# Patient Record
Sex: FEMALE | Race: BLACK OR AFRICAN AMERICAN | NOT HISPANIC OR LATINO | Employment: FULL TIME | URBAN - METROPOLITAN AREA
[De-identification: names, ages, dates, MRNs, and addresses within clinical notes are randomized per-mention and may not be internally consistent; named-entity substitution may affect disease eponyms.]

---

## 2017-10-25 ENCOUNTER — PRENATAL OFFICE VISIT - HEALTHEAST (OUTPATIENT)
Dept: MIDWIFE SERVICES | Facility: CLINIC | Age: 32
End: 2017-10-25

## 2017-10-25 DIAGNOSIS — Z75.8 LANGUAGE BARRIER: ICD-10-CM

## 2017-10-25 DIAGNOSIS — Z34.80 ENCOUNTER FOR SUPERVISION OF OTHER NORMAL PREGNANCY: ICD-10-CM

## 2017-10-25 DIAGNOSIS — Z60.3 LANGUAGE BARRIER: ICD-10-CM

## 2017-10-25 LAB — HIV 1+2 AB+HIV1 P24 AG SERPL QL IA: NEGATIVE

## 2017-10-25 SDOH — SOCIAL STABILITY - SOCIAL INSECURITY: ACCULTURATION DIFFICULTY: Z60.3

## 2017-10-25 ASSESSMENT — MIFFLIN-ST. JEOR: SCORE: 1370.13

## 2017-10-26 LAB
HBV SURFACE AG SERPL QL IA: NEGATIVE
SYPHILIS RPR SCREEN - HISTORICAL: NORMAL

## 2017-10-27 ENCOUNTER — AMBULATORY - HEALTHEAST (OUTPATIENT)
Dept: OBGYN | Facility: CLINIC | Age: 32
End: 2017-10-27

## 2017-10-27 DIAGNOSIS — Z75.8 LANGUAGE BARRIER: ICD-10-CM

## 2017-10-27 DIAGNOSIS — Z60.3 LANGUAGE BARRIER: ICD-10-CM

## 2017-10-27 DIAGNOSIS — Z34.82 ENCOUNTER FOR SUPERVISION OF OTHER NORMAL PREGNANCY IN SECOND TRIMESTER: ICD-10-CM

## 2017-10-27 SDOH — SOCIAL STABILITY - SOCIAL INSECURITY: ACCULTURATION DIFFICULTY: Z60.3

## 2017-10-30 LAB
HEMOGLOBIN A2 QUANTITATION: 3.6 % (ref 2.2–3.5)
HEMOGLOBIN ELECTROPHRESIS: ABNORMAL
HEMOGLOBIN F QUANTITATION: <0.8 % (ref 0–2)
PATH ICD:: ABNORMAL
REVIEWING PATHOLOGIST: ABNORMAL

## 2017-11-02 ENCOUNTER — HOSPITAL ENCOUNTER (OUTPATIENT)
Dept: ULTRASOUND IMAGING | Facility: HOSPITAL | Age: 32
Discharge: HOME | End: 2017-11-02
Attending: ADVANCED PRACTICE MIDWIFE

## 2017-11-02 DIAGNOSIS — Z34.80 ENCOUNTER FOR SUPERVISION OF OTHER NORMAL PREGNANCY: ICD-10-CM

## 2017-11-03 ENCOUNTER — PRENATAL OFFICE VISIT - HEALTHEAST (OUTPATIENT)
Dept: MIDWIFE SERVICES | Facility: CLINIC | Age: 32
End: 2017-11-03

## 2017-11-03 ENCOUNTER — AMBULATORY - HEALTHEAST (OUTPATIENT)
Dept: OBGYN | Facility: CLINIC | Age: 32
End: 2017-11-03

## 2017-11-03 ENCOUNTER — AMBULATORY - HEALTHEAST (OUTPATIENT)
Dept: MIDWIFE SERVICES | Facility: CLINIC | Age: 32
End: 2017-11-03

## 2017-11-03 DIAGNOSIS — O99.012 ANEMIA DURING PREGNANCY IN SECOND TRIMESTER: ICD-10-CM

## 2017-11-03 DIAGNOSIS — Z12.4 PAP SMEAR FOR CERVICAL CANCER SCREENING: ICD-10-CM

## 2017-11-03 DIAGNOSIS — Z34.82 ENCOUNTER FOR SUPERVISION OF OTHER NORMAL PREGNANCY IN SECOND TRIMESTER: ICD-10-CM

## 2017-11-03 ASSESSMENT — MIFFLIN-ST. JEOR: SCORE: 1374.22

## 2017-11-06 ENCOUNTER — AMBULATORY - HEALTHEAST (OUTPATIENT)
Dept: MIDWIFE SERVICES | Facility: CLINIC | Age: 32
End: 2017-11-06

## 2017-11-07 ENCOUNTER — AMBULATORY - HEALTHEAST (OUTPATIENT)
Dept: OBGYN | Facility: CLINIC | Age: 32
End: 2017-11-07

## 2017-11-10 LAB
HPV INTERPRETATION - HISTORICAL: ABNORMAL
HPV INTERPRETER - HISTORICAL: ABNORMAL

## 2017-11-22 ENCOUNTER — PRENATAL OFFICE VISIT - HEALTHEAST (OUTPATIENT)
Dept: MIDWIFE SERVICES | Facility: CLINIC | Age: 32
End: 2017-11-22

## 2017-11-22 ENCOUNTER — AMBULATORY - HEALTHEAST (OUTPATIENT)
Dept: OBGYN | Facility: CLINIC | Age: 32
End: 2017-11-22

## 2017-11-22 DIAGNOSIS — R87.619 ABNORMAL PAP SMEAR OF CERVIX: ICD-10-CM

## 2017-11-22 DIAGNOSIS — Z34.80 ENCOUNTER FOR SUPERVISION OF OTHER NORMAL PREGNANCY: ICD-10-CM

## 2017-11-22 DIAGNOSIS — O99.012 ANEMIA DURING PREGNANCY IN SECOND TRIMESTER: ICD-10-CM

## 2017-11-22 ASSESSMENT — MIFFLIN-ST. JEOR: SCORE: 1388.28

## 2017-11-29 ENCOUNTER — HOSPITAL ENCOUNTER (OUTPATIENT)
Dept: ULTRASOUND IMAGING | Facility: HOSPITAL | Age: 32
Discharge: HOME | End: 2017-11-29
Attending: ADVANCED PRACTICE MIDWIFE

## 2017-11-29 ENCOUNTER — COMMUNICATION - HEALTHEAST (OUTPATIENT)
Dept: MIDWIFE SERVICES | Facility: CLINIC | Age: 32
End: 2017-11-29

## 2017-11-29 DIAGNOSIS — Z34.80 ENCOUNTER FOR SUPERVISION OF OTHER NORMAL PREGNANCY: ICD-10-CM

## 2017-12-01 ENCOUNTER — COMMUNICATION - HEALTHEAST (OUTPATIENT)
Dept: OBGYN | Facility: CLINIC | Age: 32
End: 2017-12-01

## 2017-12-01 ENCOUNTER — AMBULATORY - HEALTHEAST (OUTPATIENT)
Dept: OBGYN | Facility: CLINIC | Age: 32
End: 2017-12-01

## 2017-12-01 DIAGNOSIS — O99.012 ANEMIA DURING PREGNANCY IN SECOND TRIMESTER: ICD-10-CM

## 2017-12-01 DIAGNOSIS — Z75.8 LANGUAGE BARRIER: ICD-10-CM

## 2017-12-01 DIAGNOSIS — Z34.82 ENCOUNTER FOR SUPERVISION OF OTHER NORMAL PREGNANCY IN SECOND TRIMESTER: ICD-10-CM

## 2017-12-01 DIAGNOSIS — Z60.3 LANGUAGE BARRIER: ICD-10-CM

## 2017-12-01 SDOH — SOCIAL STABILITY - SOCIAL INSECURITY: ACCULTURATION DIFFICULTY: Z60.3

## 2017-12-07 ENCOUNTER — AMBULATORY - HEALTHEAST (OUTPATIENT)
Dept: OBGYN | Facility: CLINIC | Age: 32
End: 2017-12-07

## 2017-12-07 DIAGNOSIS — N72 HIGH RISK HUMAN PAPILLOMA VIRUS (HPV) INFECTION OF CERVIX: ICD-10-CM

## 2017-12-07 DIAGNOSIS — B97.7 HIGH RISK HUMAN PAPILLOMA VIRUS (HPV) INFECTION OF CERVIX: ICD-10-CM

## 2017-12-07 ASSESSMENT — MIFFLIN-ST. JEOR: SCORE: 1397.35

## 2017-12-20 ENCOUNTER — PRENATAL OFFICE VISIT - HEALTHEAST (OUTPATIENT)
Dept: MIDWIFE SERVICES | Facility: CLINIC | Age: 32
End: 2017-12-20

## 2017-12-20 DIAGNOSIS — O99.019 ANTEPARTUM ANEMIA: ICD-10-CM

## 2017-12-20 DIAGNOSIS — Z34.80 ENCOUNTER FOR SUPERVISION OF OTHER NORMAL PREGNANCY: ICD-10-CM

## 2017-12-20 ASSESSMENT — MIFFLIN-ST. JEOR: SCORE: 1401.89

## 2018-01-15 ENCOUNTER — PRENATAL OFFICE VISIT - HEALTHEAST (OUTPATIENT)
Dept: MIDWIFE SERVICES | Facility: CLINIC | Age: 33
End: 2018-01-15

## 2018-01-15 DIAGNOSIS — M54.9 BACK PAIN AFFECTING PREGNANCY IN SECOND TRIMESTER: ICD-10-CM

## 2018-01-15 DIAGNOSIS — O99.891 BACK PAIN AFFECTING PREGNANCY IN SECOND TRIMESTER: ICD-10-CM

## 2018-01-15 DIAGNOSIS — Z34.83 ENCOUNTER FOR SUPERVISION OF OTHER NORMAL PREGNANCY IN THIRD TRIMESTER: ICD-10-CM

## 2018-01-15 LAB
FASTING STATUS PATIENT QL REPORTED: NO
GLUCOSE 1H P 50 G GLC PO SERPL-MCNC: 81 MG/DL (ref 70–139)
HGB BLD-MCNC: 12.3 G/DL (ref 12–16)

## 2018-01-15 ASSESSMENT — MIFFLIN-ST. JEOR: SCORE: 1424.57

## 2018-01-16 LAB — SYPHILIS RPR SCREEN - HISTORICAL: NORMAL

## 2018-02-16 ENCOUNTER — PRENATAL OFFICE VISIT - HEALTHEAST (OUTPATIENT)
Dept: MIDWIFE SERVICES | Facility: CLINIC | Age: 33
End: 2018-02-16

## 2018-02-16 DIAGNOSIS — O99.019 ANTEPARTUM ANEMIA: ICD-10-CM

## 2018-02-16 DIAGNOSIS — Z60.3 LANGUAGE BARRIER: ICD-10-CM

## 2018-02-16 DIAGNOSIS — Z34.83 ENCOUNTER FOR SUPERVISION OF OTHER NORMAL PREGNANCY IN THIRD TRIMESTER: ICD-10-CM

## 2018-02-16 DIAGNOSIS — O99.013 ANEMIA DURING PREGNANCY IN THIRD TRIMESTER: ICD-10-CM

## 2018-02-16 DIAGNOSIS — Z75.8 LANGUAGE BARRIER: ICD-10-CM

## 2018-02-16 SDOH — SOCIAL STABILITY - SOCIAL INSECURITY: ACCULTURATION DIFFICULTY: Z60.3

## 2018-02-16 ASSESSMENT — MIFFLIN-ST. JEOR: SCORE: 1431.37

## 2018-03-02 ENCOUNTER — PRENATAL OFFICE VISIT - HEALTHEAST (OUTPATIENT)
Dept: MIDWIFE SERVICES | Facility: CLINIC | Age: 33
End: 2018-03-02

## 2018-03-02 DIAGNOSIS — Z34.83 ENCOUNTER FOR SUPERVISION OF OTHER NORMAL PREGNANCY IN THIRD TRIMESTER: ICD-10-CM

## 2018-03-02 DIAGNOSIS — Z75.8 LANGUAGE BARRIER: ICD-10-CM

## 2018-03-02 DIAGNOSIS — Z60.3 LANGUAGE BARRIER: ICD-10-CM

## 2018-03-02 DIAGNOSIS — O99.013 ANEMIA DURING PREGNANCY IN THIRD TRIMESTER: ICD-10-CM

## 2018-03-02 SDOH — SOCIAL STABILITY - SOCIAL INSECURITY: ACCULTURATION DIFFICULTY: Z60.3

## 2018-03-02 ASSESSMENT — MIFFLIN-ST. JEOR: SCORE: 1433.64

## 2018-03-16 ENCOUNTER — PRENATAL OFFICE VISIT - HEALTHEAST (OUTPATIENT)
Dept: MIDWIFE SERVICES | Facility: CLINIC | Age: 33
End: 2018-03-16

## 2018-03-16 DIAGNOSIS — Z75.8 LANGUAGE BARRIER: ICD-10-CM

## 2018-03-16 DIAGNOSIS — Z60.3 LANGUAGE BARRIER: ICD-10-CM

## 2018-03-16 DIAGNOSIS — R51.9 HEADACHE IN PREGNANCY, ANTEPARTUM, THIRD TRIMESTER: ICD-10-CM

## 2018-03-16 DIAGNOSIS — Z34.83 ENCOUNTER FOR SUPERVISION OF OTHER NORMAL PREGNANCY IN THIRD TRIMESTER: ICD-10-CM

## 2018-03-16 DIAGNOSIS — O26.893 HEADACHE IN PREGNANCY, ANTEPARTUM, THIRD TRIMESTER: ICD-10-CM

## 2018-03-16 LAB — HGB BLD-MCNC: 12 G/DL (ref 12–16)

## 2018-03-16 SDOH — SOCIAL STABILITY - SOCIAL INSECURITY: ACCULTURATION DIFFICULTY: Z60.3

## 2018-03-16 ASSESSMENT — MIFFLIN-ST. JEOR: SCORE: 1457.68

## 2018-03-17 LAB
ALLERGIC TO PENICILLIN: NO
GP B STREP DNA SPEC QL NAA+PROBE: NEGATIVE

## 2018-03-23 ENCOUNTER — PRENATAL OFFICE VISIT - HEALTHEAST (OUTPATIENT)
Dept: MIDWIFE SERVICES | Facility: CLINIC | Age: 33
End: 2018-03-23

## 2018-03-23 DIAGNOSIS — Z75.8 LANGUAGE BARRIER: ICD-10-CM

## 2018-03-23 DIAGNOSIS — Z60.3 LANGUAGE BARRIER: ICD-10-CM

## 2018-03-23 DIAGNOSIS — Z34.83 ENCOUNTER FOR SUPERVISION OF OTHER NORMAL PREGNANCY IN THIRD TRIMESTER: ICD-10-CM

## 2018-03-23 SDOH — SOCIAL STABILITY - SOCIAL INSECURITY: ACCULTURATION DIFFICULTY: Z60.3

## 2018-03-23 ASSESSMENT — MIFFLIN-ST. JEOR: SCORE: 1451.78

## 2018-03-30 ENCOUNTER — PRENATAL OFFICE VISIT - HEALTHEAST (OUTPATIENT)
Dept: MIDWIFE SERVICES | Facility: CLINIC | Age: 33
End: 2018-03-30

## 2018-03-30 DIAGNOSIS — Z34.83 ENCOUNTER FOR SUPERVISION OF OTHER NORMAL PREGNANCY IN THIRD TRIMESTER: ICD-10-CM

## 2018-03-30 DIAGNOSIS — Z75.8 LANGUAGE BARRIER: ICD-10-CM

## 2018-03-30 DIAGNOSIS — Z60.3 LANGUAGE BARRIER: ICD-10-CM

## 2018-03-30 SDOH — SOCIAL STABILITY - SOCIAL INSECURITY: ACCULTURATION DIFFICULTY: Z60.3

## 2018-03-30 ASSESSMENT — MIFFLIN-ST. JEOR: SCORE: 1459.49

## 2018-04-06 ENCOUNTER — PRENATAL OFFICE VISIT - HEALTHEAST (OUTPATIENT)
Dept: MIDWIFE SERVICES | Facility: CLINIC | Age: 33
End: 2018-04-06

## 2018-04-06 DIAGNOSIS — Z34.83 ENCOUNTER FOR SUPERVISION OF OTHER NORMAL PREGNANCY IN THIRD TRIMESTER: ICD-10-CM

## 2018-04-06 ASSESSMENT — MIFFLIN-ST. JEOR: SCORE: 1464.94

## 2018-04-14 ENCOUNTER — HOME CARE/HOSPICE - HEALTHEAST (OUTPATIENT)
Dept: HOME HEALTH SERVICES | Facility: HOME HEALTH | Age: 33
End: 2018-04-14

## 2018-04-16 ENCOUNTER — HOME CARE/HOSPICE - HEALTHEAST (OUTPATIENT)
Dept: HOME HEALTH SERVICES | Facility: HOME HEALTH | Age: 33
End: 2018-04-16

## 2018-04-17 ENCOUNTER — HOME CARE/HOSPICE - HEALTHEAST (OUTPATIENT)
Dept: HOME HEALTH SERVICES | Facility: HOME HEALTH | Age: 33
End: 2018-04-17

## 2018-05-25 ENCOUNTER — OFFICE VISIT - HEALTHEAST (OUTPATIENT)
Dept: MIDWIFE SERVICES | Facility: CLINIC | Age: 33
End: 2018-05-25

## 2018-05-25 ENCOUNTER — COMMUNICATION - HEALTHEAST (OUTPATIENT)
Dept: MIDWIFE SERVICES | Facility: CLINIC | Age: 33
End: 2018-05-25

## 2018-05-25 DIAGNOSIS — Z75.8 LANGUAGE BARRIER: ICD-10-CM

## 2018-05-25 DIAGNOSIS — Z60.3 LANGUAGE BARRIER: ICD-10-CM

## 2018-05-25 SDOH — SOCIAL STABILITY - SOCIAL INSECURITY: ACCULTURATION DIFFICULTY: Z60.3

## 2018-05-25 ASSESSMENT — MIFFLIN-ST. JEOR: SCORE: 1427.29

## 2018-06-01 ENCOUNTER — OFFICE VISIT - HEALTHEAST (OUTPATIENT)
Dept: MIDWIFE SERVICES | Facility: CLINIC | Age: 33
End: 2018-06-01

## 2018-06-01 DIAGNOSIS — Z30.430 ENCOUNTER FOR INSERTION OF MIRENA IUD: ICD-10-CM

## 2018-06-01 DIAGNOSIS — Z11.3 ROUTINE SCREENING FOR STI (SEXUALLY TRANSMITTED INFECTION): ICD-10-CM

## 2018-06-01 DIAGNOSIS — Z01.812 PRE-PROCEDURE LAB EXAM: ICD-10-CM

## 2018-06-01 LAB
HCG UR QL: NEGATIVE
SP GR UR STRIP: 1.02 (ref 1–1.03)

## 2018-06-01 ASSESSMENT — MIFFLIN-ST. JEOR: SCORE: 1427.74

## 2018-06-04 LAB
C TRACH DNA SPEC QL PROBE+SIG AMP: NEGATIVE
N GONORRHOEA DNA SPEC QL NAA+PROBE: NEGATIVE

## 2018-07-06 ENCOUNTER — COMMUNICATION - HEALTHEAST (OUTPATIENT)
Dept: MIDWIFE SERVICES | Facility: CLINIC | Age: 33
End: 2018-07-06

## 2018-07-06 ENCOUNTER — OFFICE VISIT - HEALTHEAST (OUTPATIENT)
Dept: MIDWIFE SERVICES | Facility: CLINIC | Age: 33
End: 2018-07-06

## 2018-07-06 DIAGNOSIS — Z30.431 ENCOUNTER FOR ROUTINE CHECKING OF INTRAUTERINE CONTRACEPTIVE DEVICE (IUD): ICD-10-CM

## 2018-07-06 DIAGNOSIS — Z30.431 IUD CHECK UP: ICD-10-CM

## 2018-07-06 DIAGNOSIS — R12 HEARTBURN: ICD-10-CM

## 2018-07-06 ASSESSMENT — MIFFLIN-ST. JEOR: SCORE: 1425.93

## 2018-07-09 ENCOUNTER — COMMUNICATION - HEALTHEAST (OUTPATIENT)
Dept: ADMINISTRATIVE | Facility: CLINIC | Age: 33
End: 2018-07-09

## 2018-08-03 ENCOUNTER — OFFICE VISIT - HEALTHEAST (OUTPATIENT)
Dept: MIDWIFE SERVICES | Facility: CLINIC | Age: 33
End: 2018-08-03

## 2018-08-03 DIAGNOSIS — Z71.9 ENCOUNTER FOR COUNSELING: ICD-10-CM

## 2018-08-03 DIAGNOSIS — Z71.9 HEALTH COUNSELING: ICD-10-CM

## 2018-08-03 DIAGNOSIS — Z65.8 INSUFFICIENT SOCIAL SUPPORT: ICD-10-CM

## 2018-08-03 DIAGNOSIS — Z63.79 STRESSFUL LIFE EVENTS AFFECTING FAMILY AND HOUSEHOLD: ICD-10-CM

## 2018-08-03 ASSESSMENT — MIFFLIN-ST. JEOR: SCORE: 1430.92

## 2019-04-30 ENCOUNTER — OFFICE VISIT - HEALTHEAST (OUTPATIENT)
Dept: MIDWIFE SERVICES | Facility: CLINIC | Age: 34
End: 2019-04-30

## 2019-04-30 DIAGNOSIS — Z30.431 IUD CHECK UP: ICD-10-CM

## 2019-04-30 DIAGNOSIS — Z02.0 ENCOUNTER FOR SCHOOL HEALTH EXAMINATION: ICD-10-CM

## 2019-04-30 ASSESSMENT — MIFFLIN-ST. JEOR: SCORE: 1348.81

## 2019-05-03 ENCOUNTER — AMBULATORY - HEALTHEAST (OUTPATIENT)
Dept: NURSING | Facility: CLINIC | Age: 34
End: 2019-05-03

## 2019-05-07 ENCOUNTER — COMMUNICATION - HEALTHEAST (OUTPATIENT)
Dept: INTERNAL MEDICINE | Facility: CLINIC | Age: 34
End: 2019-05-07

## 2019-05-08 ENCOUNTER — OFFICE VISIT - HEALTHEAST (OUTPATIENT)
Dept: INTERNAL MEDICINE | Facility: CLINIC | Age: 34
End: 2019-05-08

## 2019-05-08 DIAGNOSIS — Z22.7 LATENT TUBERCULOSIS BY BLOOD TEST: ICD-10-CM

## 2019-05-08 DIAGNOSIS — R76.11 MANTOUX: POSITIVE: ICD-10-CM

## 2019-05-08 ASSESSMENT — MIFFLIN-ST. JEOR: SCORE: 1374.67

## 2019-05-10 LAB
GAMMA INTERFERON BACKGROUND BLD IA-ACNC: 0.2 IU/ML
M TB IFN-G BLD-IMP: POSITIVE
MITOGEN IGNF BCKGRD COR BLD-ACNC: 6.69 IU/ML
MITOGEN IGNF BCKGRD COR BLD-ACNC: 6.69 IU/ML
QTF INTERPRETATION: ABNORMAL
QTF MITOGEN - NIL: 6.69 IU/ML

## 2019-05-14 ENCOUNTER — RECORDS - HEALTHEAST (OUTPATIENT)
Dept: SCHEDULING | Facility: CLINIC | Age: 34
End: 2019-05-14

## 2019-05-17 ENCOUNTER — COMMUNICATION - HEALTHEAST (OUTPATIENT)
Dept: ADMINISTRATIVE | Facility: CLINIC | Age: 34
End: 2019-05-17

## 2019-06-04 ENCOUNTER — OFFICE VISIT - HEALTHEAST (OUTPATIENT)
Dept: INFECTIOUS DISEASES | Facility: CLINIC | Age: 34
End: 2019-06-04

## 2019-06-04 DIAGNOSIS — Z22.7 LATENT TUBERCULOSIS BY BLOOD TEST: ICD-10-CM

## 2019-07-19 ENCOUNTER — COMMUNICATION - HEALTHEAST (OUTPATIENT)
Dept: ADMINISTRATIVE | Facility: CLINIC | Age: 34
End: 2019-07-19

## 2019-07-19 ENCOUNTER — AMBULATORY - HEALTHEAST (OUTPATIENT)
Dept: ADMINISTRATIVE | Facility: CLINIC | Age: 34
End: 2019-07-19

## 2019-07-25 ENCOUNTER — COMMUNICATION - HEALTHEAST (OUTPATIENT)
Dept: INFECTIOUS DISEASES | Facility: CLINIC | Age: 34
End: 2019-07-25

## 2021-05-28 NOTE — TELEPHONE ENCOUNTER
Called pt through  services and [facility] for pt to call back.  Pt had a positive PPD (mantoux) and needs to be seen for follow up.   Pt needs an appointment and chest XRay - please schedule with PEGGY Galarza or Norma Atkins CNP on the soonest available.  Pt will need a chest xray and will need orders.  Please forward back to clinic with dates scheduled.      If pt has different PCP that she is seeing then please inform patient that we will need the records from the visit addressing the positive PPD.  Her OB/GYN will not manage the follow up from the test.

## 2021-05-28 NOTE — PROGRESS NOTES
Patient here for TB read. Area is positive with a 1mm induration.   Redness surrounds eliud is 10mm. Patient is informed to follow up with ordering physican

## 2021-05-28 NOTE — TELEPHONE ENCOUNTER
----- Message from Kiel Baez CMA sent at 5/7/2019  2:49 PM CDT -----  Regarding: FW: POSITIVE PPD      ----- Message -----  From: Fide Altman APRN, CNM  Sent: 5/7/2019  12:40 PM  To: [facility] Support Pool  Subject: FW: POSITIVE PPD                                 Did she ever get her paperwork?  I hope this was filled out accurately.  I think she will need a chest x-ray.  I would have her see Family/Internal Med for f-up on this.    SAMIRA  ----- Message -----  From: Isabel Stover LPN  Sent: 5/3/2019   4:35 PM  To: SUPA Smith CNM  Subject: POSITIVE PPD                                     Patient is seen today for PPD reading. Area to RFA is noted to be red approx 10 mm to surrounding tissue. Induration is 1mm raised. Patient is told to follow up with ordering provider. She is assisted to make appointment.

## 2021-05-28 NOTE — TELEPHONE ENCOUNTER
Name of caller: Patient  Phone number where you may be reached: [phone]  Reason for call: Pt called to schedule a follow up appt but is unsure if she is supposed to schedule with the midwives or with Norma Atkins NP. Please call pt with a Pashto  to discuss.  Best time to call back: any  If we don't reach you, is it okay to leave a detailed message? yes

## 2021-05-28 NOTE — TELEPHONE ENCOUNTER
Patient Returning Call  Reason for call:   Patient is calling  back  Information relayed to patient:   Called pt through  services and LVM for pt to call back.  Pt had a positive PPD (mantoux) and needs to be seen for follow up.   Pt needs an appointment and chest XRay - please schedule with PEGGY Galarza or Norma Atkins CNP on the soonest available.  Pt will need a chest xray and will need orders.  Please forward back to clinic with dates scheduled.       If pt has different PCP that she is seeing then please inform patient that we will need the records from the visit addressing the positive PPD.  Her OB/GYN will not manage the follow up from the test  Patient has additional questions:  Yes  If YES, what are your questions/concerns:  Patient is scheduled tomorrow morning at 10 am with SAUNDRA Huerta to leave a detailed message?: No call back needed

## 2021-05-28 NOTE — PROGRESS NOTES
Internal Medicine Office Visit  [facility]  Patient Name: Anabelle Herring  Patient Age: 34 y.o.  YOB: 1985  MRN: [id number]    Date of Visit: 2019  Reason for Office Visit:   Chief Complaint   Patient presents with     Positive PPD     Had a positive mantoux test. Needing a chest x-ray.            Assessment / Plan / Medical Decision Makin. Mantoux: positive  - XR Chest 2 Views  - QTF-Mycobacterium tuberculosis by QuantiFERON-TB Gold Plus    Patient will follow-up post diagnostic testing, sooner if needed.  All patient's questions addressed she verbalized understanding and agreement with plan.    Orders Placed This Encounter   Procedures     XR Chest 2 Views     QTF-Mycobacterium tuberculosis by QuantiFERON-TB Gold Plus   Followup: Return in about 1 week (around 5/15/2019). earlier if needed.    Health Maintenance Review  Health Maintenance   Topic Date Due     ADVANCE DIRECTIVES DISCUSSED WITH PATIENT  2003     INFLUENZA VACCINE RULE BASED (Season Ended) 2019     PAP SMEAR  2022     TD 18+ HE  01/15/2028     TDAP ADULT ONE TIME DOSE  Completed         I am having Anabelle Herring maintain her prenatal vit no.128-iron-folic, ferrous sulfate SR, acetaminophen, prenatal vitamin gummy, calcium carbonate, ranitidine, and levonorgestrel.      HPI:  Anabelle Herring is a 34 y.o. year old who presents to the office today for follow-up after positive PPD testing.  Patient denies any recent travel, no coughing shortness of breath no recent exposure to known ill individuals.  She works in adult foster care.  She had a negative chest x-ray 2 years ago when traveling to the [place].        Review of Systems- pertinent positive in bold:  Constitutional: Fever, chills, night sweats, fainting, weight change, fatigue, dizziness, sleeping difficulties, loud snoring/pauses in breathing  Eyes: change in  "vision, blurred or double vision, redness/eye pain  Ears, nose, mouth, throat: change in hearing, ear pain, hoarseness, difficulty swallowing, sores in the mouth or throat  Respiratory: shortness of breath, cough, bloody sputum, wheezing  Cardiovascular: chest pain, palpitations   Skin: change in moles/freckles, rash, nodules        Current Scheduled Meds:  Outpatient Encounter Medications as of 5/8/2019   Medication Sig Dispense Refill     acetaminophen (TYLENOL EXTRA STRENGTH) 500 MG tablet Take 2 tablets (1,000 mg total) by mouth every 6 (six) hours as needed for pain. 45 tablet 0     calcium carbonate (TUMS) 300 mg (750 mg) Chew Chew 1-2 tablets 4 (four) times a day. 60 tablet 1     ferrous sulfate SR (SLOW FE) 142 mg (45 mg iron) TbER Take 142 mg by mouth daily. 90 tablet 3     levonorgestrel (MIRENA) 20 mcg/24 hours (5 yrs) 52 mg IUD 1 each by Intrauterine route once.       prenatal vit no.128-iron-folic (PRENATAL VITAMIN) 28 mg iron- 800 mcg Tab Take 1 tablet by mouth daily. 90 tablet 3     prenatal vitamin gummy 400 mcg-35 mg -25 mg-5 mg Chew Chew 1 each daily. 1 each 3     ranitidine (ZANTAC) 150 MG tablet Take 1 tablet (150 mg total) by mouth 2 (two) times a day. 60 tablet 2     No facility-administered encounter medications on file as of 5/8/2019.      Past Medical History:   Diagnosis Date     Varicella     unknown     History reviewed. No pertinent surgical history.  Social History     Tobacco Use     Smoking status: Never Smoker     Smokeless tobacco: Never Used   Substance Use Topics     Alcohol use: No     Comment: before pregnancy     Drug use: No       Objective / Physical Examination:  Vitals:    05/08/19 1011   BP: 122/70   Pulse: 75   Resp: 16   Temp: 98.6  F (37  C)   SpO2: 100%   Weight: 149 lb (67.6 kg)   Height: 5' 5.5\" (1.664 m)     Wt Readings from Last 3 Encounters:   05/08/19 149 lb (67.6 kg)   04/30/19 143 lb 4.8 oz (65 kg)   08/03/18 161 lb 6.4 oz (73.2 kg)     Body mass index is 24.42 " kg/m .     General Appearance: Alert and oriented, cooperative, affect appropriate, speech clear, in no apparent distress  Head: Normocephalic, atraumatic  Eyes:   Conjunctivae clear and sclerae non-icteric  Throat: Lips and mucosa moist  Lungs: Clear to auscultation bilaterally. No adventitious lung sounds noted. Normal inspiratory and expiratory effort  Cardiovascular: Regular rate, normal S1, S2. No murmurs, rubs, or gallops  Integumentary: Warm and dry. Without suspicious looking lesions  Neuro: Alert and oriented, follows commands appropriately.     No results found.  Recent Results (from the past 240 hour(s))   TB Skin Test   Result Value Ref Range    TB Skin Test Positive (!) Negative, See Separate Report, Invalid, Equivocal, Invalid Result:  Specimen set-up for culture, ERROR    Induration 1mm mm             Norma Atkins, CNP

## 2021-05-28 NOTE — PROGRESS NOTES
Assessment:      34 y.o., routine Mirena IUD check -  11 months post-insertion, correct placement confirmed.        Plan:     1.  Reassurance re: correct placement, normalcy of side effects and that these often lessen with extended use of IUD.  Discussed possible reason for her period That came on 4/10/2019.  It is possible that her menstrual cycle was suppressed by her breast-feeding, and that she is still going to have one on the Mirena.  Advised to monitor for the next couple of months to get a sense for where her bleeding is at.  2.  Recommended taking ibuprofen 600 mg h2cmqnw x 5 days to reduce amount and duration of bleeding.    3.  Taught and encouraged to check IUD strings monthly.    4.  Warning signs related to IUD use (PAINS) and when to call CNM reviewed.  5.   TB test done today for school.  Will leave paperwork to be picked up on Friday.      SUPA Smith, NATY  5/1/2019  3:48 PM    Total time spent with patient 20 minutes, >50% counseling, education and coordination of care.    Subjective:      Anabelle Herring is a 34 y.o. female who presents for IUD check with her 3 children and professional [place] . This was inserted in June/2018.  The patient is worried because she got it.  On 4/10/2019 and it lasted 9 days.  She believes that she was told that she would not get a period while on the Mirena IUD.  The patient is sexually active. She has not  checked her IUD strings.  She states she does not know how to check her IUD strings.  She is still breast-feeding her daughter who turned 1, but is not breast-feeding all that frequently.    At the very end of the visit she produced paperwork from her school on a physical exam form.  I did not have time to perform a full physical exam at that time, will defer to her postpartum visits and medical history to fill out.    Review of Systems  Pertinent items are noted in HPI.     Objective:     Pelvic:SE only - normal vaginal and  mucosal tissue.  No abnormal discharge or odor.  Cervix normal appearance with IUD strings visible x 2 without evidence of IUD itself.  Approximately 3 cm long.

## 2021-05-29 NOTE — PATIENT INSTRUCTIONS - HE
Please start on   Isoniazide 300 mg tablets: take 3 every Sunday x 12 weeks  And  Rifapentine 150 mg tablets: take 6 every Sunday x 12 weeks.    OK to take with food.    Watch for orange urine: this is normal    Can cause rash, nausea, or achy joints, and jaundice rarely.    Call if any problems.    Angelito Aquino

## 2021-05-29 NOTE — PROGRESS NOTES
Consultation - Infectious Disease  Anabelle Herring,  1985, MRN [id number]  [facility] CLIN[facility]  Mantoux: positive [R76.11];Latent tuberculosis by blood test [R76.11]    PCP: Skyla Khoury, SUPA,GINNY, [phone]   Code status:  full       Extended Emergency Contact Information  Primary Emergency Contact: Andrew Lopes   [place]  Mobile Phone: [phone]  Relation: Spouse       Assessment and Plan       Assessment:  1. From [place]  2. In [place] x 2 yr[place]  3. No TB hx or exposure hx.  4. Working as a CRNA in [employer]  5. 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 CXR neg. No symptoms.  Lifetime risk of TB about 5 %.  Agrees to preventive Rx  Recommendations:   q  x 12 weeks  Rifapentin 900 mg q  x 12 weeks  Counciled regarding side effects.  RTC 3 mos so I can write a letter confirming Rx completed    Thanks for asking me to participate in this patients care.  Angelito Aquino       Chief Complaint Pos QFN TB test.       HPI   We have been requested by Dr. Skyla Khoury  to evaluate Anabelle Herring for pos QFN TB Test.  Hx: working at [employer]. Screened for TB. PPD ecquivocal. 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 No sx.  No exposure.  From [place]. HIV neg. Has 3 kids. Uses IUD.    HBV, HCV neg.     Medical History  Active Ambulatory (Non-Hospital) Problems    Diagnosis     Insufficient social support     Stressful life events affecting family and household     Health counseling     Encounter for counseling     Heartburn     Routine screening for STI (sexually transmitted infection)     High risk human papilloma virus (HPV) infection of cervix     Cyst of left ovary     Past Medical History:   Diagnosis Date     Varicella      PMH reviewed, updated, w/o changes. Surgical History  She  has no past surgical history on file.   Social History  Reviewed, and she  reports that she has never smoked. She has never used smokeless  tobacco. She reports that she does not drink alcohol or use drugs.   Allergies  No Known Allergies Family History  Reviewed, and family history includes Anemia in her son; Diabetes in her maternal aunt and maternal grandmother; No Medical Problems in her brother, daughter, father, mother, and sister.        Prior to Admission Medications   Current Outpatient Medications on File Prior to Visit   Medication Sig Dispense Refill     levonorgestrel (MIRENA) 20 mcg/24 hours (5 yrs) 52 mg IUD 1 each by Intrauterine route once.       [DISCONTINUED] acetaminophen (TYLENOL EXTRA STRENGTH) 500 MG tablet Take 2 tablets (1,000 mg total) by mouth every 6 (six) hours as needed for pain. 45 tablet 0     [DISCONTINUED] calcium carbonate (TUMS) 300 mg (750 mg) Chew Chew 1-2 tablets 4 (four) times a day. 60 tablet 1     [DISCONTINUED] ferrous sulfate SR (SLOW FE) 142 mg (45 mg iron) TbER Take 142 mg by mouth daily. 90 tablet 3     [DISCONTINUED] levonorgestrel (MIRENA) 20 mcg/24 hours (5 yrs) 52 mg IUD 1 each by Intrauterine route once.       [DISCONTINUED] prenatal vit no.128-iron-folic (PRENATAL VITAMIN) 28 mg iron- 800 mcg Tab Take 1 tablet by mouth daily. 90 tablet 3     [DISCONTINUED] prenatal vitamin gummy 400 mcg-35 mg -25 mg-5 mg Chew Chew 1 each daily. 1 each 3     [DISCONTINUED] ranitidine (ZANTAC) 150 MG tablet Take 1 tablet (150 mg total) by mouth 2 (two) times a day. 60 tablet 2     No current facility-administered medications on file prior to visit.           Review of Systems:  Review of systems:  Eyes: no burning, dry eyes, diplopia  ENT: no sinus pain, sore throat  Neck: no stiffness or swelling  Resp: no cough, sputum, hemoptysis  Cv: no chest pain, ADAM, PND, edema  GI: no nausea, vomiting, diarrhea, blood in stool  : no dysuria, blood in urine, urine retention.  Musculoskelatal: no joint swelling, bunions, vertebral pain  Neurology: no weakness, numbness, nerve pain.    Social hx, family hx reviewed, unchanged and  noncontributory.   Physical Exam:  /72 (Patient Site: Right Arm, Patient Position: Sitting, Cuff Size: Adult Regular)   Pulse 80   Temp 98.2  F (36.8  C)   Wt 141 lb (64 kg)   BMI 23.11 kg/m        Looks fine.  Nodes: no cervical, axillary or inguinal adenopathy  ENT: TMs clear, oropharynx without exudate or thrush  Respiratory: normal respiratory patter, no rales or rubs.  CV: normal heart tones. No rub, murmur or S3. No JVD.  Abdmomen: soft, normal bowel sounds, non-tender, no masses or   organomegaly  Extremities: normal venous pattern. Good pulses. No edema. No joint effusions.  Neurologic: oriented x 3. Cranial nerves 2-12 intact. No focal weakness or sensory loss.       Pertinent Labs  Lab Results:personally reviewed.   No results found for: NA, K, CO2, BUN, CREATININE, GLUCOSE, CALCIUM  Lab Results   Component Value Date    WBC 5.2 10/25/2017    HGB 13.3 04/13/2018    HGB 12.0 03/16/2018    HGB 12.3 01/15/2018    HCT 31.2 (L) 10/25/2017    MCV 86 10/25/2017     10/25/2017        Pertinent Radiology  Radiology Results: Personally reviewed image/s  Microbiology:none

## 2021-05-31 VITALS — WEIGHT: 155 LBS | BODY MASS INDEX: 24.91 KG/M2 | HEIGHT: 66 IN

## 2021-05-31 VITALS — BODY MASS INDEX: 23.78 KG/M2 | HEIGHT: 66 IN | WEIGHT: 148 LBS

## 2021-05-31 VITALS — HEIGHT: 66 IN | WEIGHT: 152 LBS | BODY MASS INDEX: 24.43 KG/M2

## 2021-05-31 VITALS — HEIGHT: 66 IN | WEIGHT: 162 LBS | BODY MASS INDEX: 26.03 KG/M2

## 2021-05-31 VITALS — HEIGHT: 66 IN | WEIGHT: 160 LBS | BODY MASS INDEX: 25.71 KG/M2

## 2021-05-31 VITALS — WEIGHT: 161.5 LBS | HEIGHT: 66 IN | BODY MASS INDEX: 25.96 KG/M2

## 2021-05-31 VITALS — BODY MASS INDEX: 23.93 KG/M2 | HEIGHT: 66 IN | WEIGHT: 148.9 LBS

## 2021-05-31 VITALS — WEIGHT: 154 LBS | BODY MASS INDEX: 24.75 KG/M2 | HEIGHT: 66 IN

## 2021-06-01 VITALS — WEIGHT: 160.6 LBS | BODY MASS INDEX: 25.81 KG/M2 | HEIGHT: 66 IN

## 2021-06-01 VITALS — HEIGHT: 66 IN | WEIGHT: 167.7 LBS | BODY MASS INDEX: 26.95 KG/M2

## 2021-06-01 VITALS — HEIGHT: 66 IN | WEIGHT: 160.3 LBS | BODY MASS INDEX: 25.76 KG/M2

## 2021-06-01 VITALS — BODY MASS INDEX: 26.68 KG/M2 | HEIGHT: 66 IN | WEIGHT: 166 LBS

## 2021-06-01 VITALS — BODY MASS INDEX: 27.14 KG/M2 | HEIGHT: 66 IN | WEIGHT: 168.9 LBS

## 2021-06-01 VITALS — BODY MASS INDEX: 26.89 KG/M2 | WEIGHT: 167.3 LBS | HEIGHT: 66 IN

## 2021-06-01 VITALS — BODY MASS INDEX: 25.94 KG/M2 | HEIGHT: 66 IN | WEIGHT: 161.4 LBS

## 2021-06-01 VITALS — HEIGHT: 66 IN | WEIGHT: 160.7 LBS | BODY MASS INDEX: 25.83 KG/M2

## 2021-06-02 VITALS — HEIGHT: 66 IN | BODY MASS INDEX: 23.95 KG/M2 | WEIGHT: 149 LBS

## 2021-06-02 VITALS — WEIGHT: 143.3 LBS | HEIGHT: 66 IN | BODY MASS INDEX: 23.03 KG/M2

## 2021-06-02 VITALS — WEIGHT: 141 LBS | BODY MASS INDEX: 23.11 KG/M2

## 2021-06-07 ENCOUNTER — RECORDS - HEALTHEAST (OUTPATIENT)
Dept: MIDWIFE SERVICES | Facility: CLINIC | Age: 36
End: 2021-06-07

## 2021-06-07 DIAGNOSIS — Z34.80 ENCOUNTER FOR SUPERVISION OF OTHER NORMAL PREGNANCY, UNSPECIFIED TRIMESTER: ICD-10-CM

## 2021-06-13 NOTE — PROGRESS NOTES
"PRENATAL VISIT   FIRST OBSTETRICAL EXAM - OB    Assessment / Impression     First prenatal visit at 15w4d    Language barrier      Plan:     -Physical not performed this visit--will need pap and gc/ct with PE.  Discuss optimal wt gain NV--25-35#  -Scheduled for PE portion of visit  -Prenatal vitamin rx sent to [facility] pharmacy.  Instructed to take ONE daily  -Dating ultrasound ordered--advised on  line and Radiology phone number  -IOB labs drawn--lead, hgb electrophoresis, varicella added.  -Patient is not interested in waterbirth.    -Reviewed prenatal care schedule  -Anticipatory guidance for common pregnancy questions and concerns reviewed.   -Danger s/sx for this trimester reviewed with patient.  -Reviewed genetic screening options with patient, patient does  elect for second trimester screening.  -IOB packet given   -[facility] services and hospital options reviewed; emergency and scheduling phone numbers given to patient.  -Return to clinic next week for PE    Medical/Surgical/Social/Genetic/Family history reviewed and updated in the chart  Total time spent with patient: 60 minutes, >50% time spent counseling and coordinating care.  SUPA Low, NAYT    Subjective:    Anabelle Lima Maxjuan pablo is a 32 y.o. here today for her First Obstetrical Exam.  She speaks Cypriot, an  was not available.  A phone  was utilized for this visit.  Anabelle moved from [facility] in September.  She had both of her babies there. She did have a prenatal visit and an ultrasound while still in [facility] at ~8weeks.  She has the images from the ultrasound (17)--but there is no dating info.    She  both of her other children.  She had one period prior to becoming pregnant. LMP 17.  She states she was taking PNVs but stopped when she moved.    Patient notes low back pain \"over my kidneys\".  States she had this prior to pregnancy and her doctor told her it would go away.  Denies " "UTI symptoms.  Advised we'd be doing a urine culture today to look for infection.    OB History    Para Term  AB Living   3 2 2   2   SAB TAB Ectopic Multiple Live Births       2      # Outcome Date GA Lbr Matthew/2nd Weight Sex Delivery Anes PTL Lv   3 Current            2 Term 16 38w0d  7 lb 0.9 oz (3.2 kg) M Vag-Spont  N ANG      Birth Comments: [place]   1 Term 11 40w0d  6 lb 13.4 oz (3.1 kg) F Vag-Spont   ANG      Birth Comments: [place]; 40+weeks          Expected Date of Delivery: 2018, by Last Menstrual Period    Past Medical History:   Diagnosis Date     Varicella     unknown     History reviewed. No pertinent surgical history.  Social History   Substance Use Topics     Smoking status: Never Smoker     Smokeless tobacco: Never Used     Alcohol use No      Comment: before pregnancy     Current Outpatient Prescriptions   Medication Sig Dispense Refill     prenatal vit no.128-iron-folic (PRENATAL VITAMIN) 28 mg iron- 800 mcg Tab Take 1 tablet by mouth daily. 90 tablet 3     No current facility-administered medications for this visit.      No Known Allergies          Pregnancy Risk Factors: new to the [place], language barrier    Review of Systems  General:  Denies problem  Eyes: Denies problem  Ears/Nose/Throat: Denies problem  Cardiovascular: Denies problem  Respiratory:  Denies problem  Gastrointestinal:  Denies problem  Genitourinary: Denies problem  Musculoskeletal: low back pain  Skin: Denies problem  Neurologic: Denies problem  Psychiatric: Denies problem  Endocrine: Denies problem  Heme/Lymphatic: Denies problem   Allergic/Immunologic: Denies problem       Objective:   Objective    Vitals:    10/25/17 1409   BP: 104/68   Pulse: 88   Resp: 20   Weight: 148 lb (67.1 kg)   Height: 5' 5.5\" (1.664 m)     Physical Exam:  No PE completed  General Appearance: Alert, cooperative, no distress, appears stated age  FHT: 158       Lab: No results found for this or any previous visit.       "

## 2021-06-13 NOTE — PROGRESS NOTES
PRENATAL VISIT: EXAM ONLY; follow up from IOB.    Assessment / Impression     Follow up from first prenatal visit at 16w6d; patient needed Pap smear, GC/CT  Review of IOB labs including slightly elevated A2 in her hemoglobin electrophoresis. Normal upper range 3.5% and patient with 3.6%.       Plan:   -Elevated A2 hemoglobin electrophoresis: patient to continue to take Slow Fe and has prescription. Will discuss results with Dr. Hunt and will update POC PRN.  -Reviewed prenatal care schedule.  -Anticipatory guidance for common pregnancy questions and concerns reviewed.   -Danger s/sx for this trimester reviewed with patient.  -CNM services and hospital options reviewed; emergency and scheduling phone numbers given to patient.  -Return to clinic 3 weeks; 1 month from her IOB visit with previous CNM.    Total time spent with patient: 20 minutes, >50% time spent counseling and coordinating care.    Subjective:    Anabelle Herring is a 32 y.o.  here today for her a follow visit - needs an IOB exam including a Pap smear and GC/CT testing today. Unable to complete during first visit. She is doing well and has been doingwell over the last 9 days or so. She states that she is feeling improved each and every day and denies any concerns today. Notes FM. Denies any warning s/sx. Notes low back pain that she had with her other pregnancies.     Pap smear: Last Pap: Never, per patient. Patient due for Pap and HPV co-testing.     EPDS score today: 0    Review of Systems  General:  Fatigue but otherwise denies problem  Eyes: Denies problem  Ears/Nose/Throat: Denies problem  Cardiovascular: Denies problem  Respiratory:  Denies problem  Gastrointestinal:  Nausea without vomiting, otherwise negative  Genitourinary: Denies any discharge, vaginal bleeding or itchiness or any other problem  Musculoskeletal:  Breast tenderness otherwise denies problem  Skin: Denies problem  Neurologic: Denies  "problem  Psychiatric: Denies problem  Endocrine: Denies problem  Heme/Lymphatic: Denies problem   Allergic/Immunologic: Denies problem           Objective:   Objective    Vitals:    11/03/17 1547   BP: 102/62   Pulse: 68   Weight: 148 lb 14.4 oz (67.5 kg)   Height: 5' 5.5\" (1.664 m)     Physical Exam:  General Appearance: Alert, cooperative, no distress, appears stated age  HEENT: Normocephalic, without obvious abnormality, atraumatic. Conjunctiva/corneas clear, does not wear corrective lenses  Neck: Supple, symmetrical, trachea midline, no adenopathy.   Thyroid: not enlarged, symmetric, no tenderness/mass/nodules  Back: Symmetric, no curvature, ROM normal, no CVA tenderness  Lungs: Clear to auscultation bilaterally, respirations unlabored  Heart: Regular rate and rhythm, S1 and S2 normal, no murmur, rub, or gallop. No edema to lower extremities.  Breasts: No breast masses, tenderness, asymmetry, or nipple discharge. Nipples are everted.  Abdomen: Gravid, soft, non-tender, bowel sounds active all four quadrants, no masses.   FHT: 155   Vulva:  no sign of lesions or condyloma, normal hair distribution  Vagina: pink, normal rugae, no abnormal discharge  Cervix:  long/thick/closed, no lesions or inflammation noted, negative CMT with exam  Uterus: mid position, non tender, enlarged approximately 16 weeks size  Adnexa:  no masses appreciated, non-tender with palpation  Pelvic spines:AVERAGE  Sacrum:STRAIGHT  Suprapubic Arch:NORMAL  Pelvis type:gynecoid            Musculoskeletal: Extremities normal, atraumatic, no cyanosis   Skin: Skin color, texture, turgor normal, no rashes or lesions  Lymph nodes: Cervical, supraclavicular, and axillary nodes normal  Neurologic: Alert and oriented x 3. Normal speech    Lab:   Results for orders placed or performed in visit on 10/25/17   Culture, Urine   Result Value Ref Range    Culture No Growth    ABO/RH Typing (OP order)   Result Value Ref Range    HML ABO/Rh Typing B POS     HML " ABO/Rh Repeat Typing B POS    Hepatitis B Surface antigen (HBsAG)   Result Value Ref Range    Hepatitis B Surface Ag Negative Negative   HIV Antigen/Antibody Screening Cascade   Result Value Ref Range    HIV Antigen / Antibody Negative Negative   HML Antibody Screen   Result Value Ref Range    HML Antibody Screen Negative Negative   RPR   Result Value Ref Range    Syphilis Screen Cascade Non-Reactive Non-Reactive   Rubella Immune Status (IgG)   Result Value Ref Range    Rubella IgG Immune Immune   Lead, Blood   Result Value Ref Range    Lead  <5.0 ug/dL    Collection Method Venous     Lead Retest No    Hemoglobinopathy/Thalassemia Cascade   Result Value Ref Range    Hgb A2 Quant 3.6 (H) 2.2 - 3.5 %    Hgb F Quant <0.8 0.0 - 2.0 %    Hemoglobin,ELP       Slight increase in hemoglobin A2 (3.6%). CBC and clinical history reviewed. Please correlate with family history if clinically indicated.     Path ICD: Z34.80     Interpreted By: Judd Carter MD    Varicella Zoster Immune Status Antibody, IgG   Result Value Ref Range    Varicella Zoster Immune Status Immune Immune   HM1 (CBC with Diff)   Result Value Ref Range    WBC 5.2 4.0 - 11.0 thou/uL    RBC 3.65 (L) 3.80 - 5.40 mill/uL    Hemoglobin 11.0 (L) 12.0 - 16.0 g/dL    Hematocrit 31.2 (L) 35.0 - 47.0 %    MCV 86 80 - 100 fL    MCH 30.1 27.0 - 34.0 pg    MCHC 35.3 32.0 - 36.0 g/dL    RDW 13.8 11.0 - 14.5 %    Platelets 160 140 - 440 thou/uL    MPV 9.9 8.5 - 12.5 fL    Neutrophils % 60 50 - 70 %    Lymphocytes % 35 20 - 40 %    Monocytes % 5 2 - 10 %    Eosinophils % 0 0 - 6 %    Basophils % 0 0 - 2 %    Neutrophils Absolute 3.1 2.0 - 7.7 thou/uL    Lymphocytes Absolute 1.8 0.8 - 4.4 thou/uL    Monocytes Absolute 0.2 0.0 - 0.9 thou/uL    Eosinophils Absolute 0.0 0.0 - 0.4 thou/uL    Basophils Absolute 0.0 0.0 - 0.2 thou/uL   Quad Screen (Second Trimester) Maternal   Result Value Ref Range    Calculated Age at ERIC 32 years     Maternal Weight 148 lbs    Insulin  Dependent Diabetes None     Black Race Black     ERIC by LMP 04/14/18     GA on Collection by Dates 15,4 wk,d    GA Used in Risk Estimate Dates estimate     AFP 0.97 MoM ( 35.7 ng/mL )     UE3 0.85 MoM ( 0.69 ng/mL )     HCG, TOTAL 1.10 MoM ( 50.4 IU/mL )     INHIBIN 1.16 MoM ( 196 pg/mL )     DOWN SYNDROME SCREEN RISK ESTIMATE 1/2,700     DOWN SYNDROME MATERNAL AGE RISK 1/490     TRISOMY 18 SCREEN RISK ESTIMATE 1/43,000     INTERPRETATION SEE BELOW     RECOMMENDED FOLLOW UP None.     GENERAL TEST INFORMATION SEE BELOW     OTHER INFORMATION Initial testing    Lead With Demographics   Result Value Ref Range    Lead, B 2.0 0.0 - 4.9 mcg/dL    Venous/Capillary Venous     Patient Street Address [address] [facility] [address] [facility]     Patient Zip Code [id number] [place] [address]     Patient Home Phone [phone]     Patient Race      Patient Ethnicity NON      Patient Occupation NA     Patient Employer NA     Guardian First Name NA     Guardian Last Name NA     Health Care Provider Name LEANDRO BELLE     Health Care Provider Street Address      Health Care Provider [facility]     Health Care Provider [facility]     Health Care Provider Zip Code      Health Care Provider Phone [phone]     Submitting Laboratory Phone [phone]

## 2021-06-13 NOTE — PROGRESS NOTES
10/27/17: Phone Call: NATY called with Telugu  and discussed IOB lab results. All have resulted except thalassemia. Discussed negative QS. Advised taking iron supplement and she will get Slow FE OTC and take with orange juice. Discussed high iron foods.  SUPA Dior,NATY

## 2021-06-14 NOTE — PROGRESS NOTES
Colposcopy Procedure Note    Anabelle Herring is a 32 y.o. female is here today for a Colposcopy. Patient was referred by the [facility] program for a normal pap smear that was positive for HR HPV type 16. Here with the .   Indications:      female that is 21 w and 5 days gestation with an ERIC of 18. Denies history of abnormal pap smear. Otherwise healthy, non smoker. This was her first pap smear.     Procedure Details   The reason for procedure is explained and informed consent obtained.    Speculum placed in vagina and visualization of cervix achieved, cervix swabbed with 3% acetic acid solution. Procedure details: No biopsies taken    Findings:  Cervix: SCJ visualized 360 degrees without lesions;       Specimens: none    Complications: none.    Plan:  Role of HPV in causing cervical pap smear abnormalities, dysplasia, and cancer is reviewed with the patient. Repeat pap smear and HPV testing in one year per ASCCP guidelines.       Gwendolyn Candelaria

## 2021-06-14 NOTE — PROGRESS NOTES
"A message was sent to Dr. Hunt by this [facility] regarding Anabelle's electrophoresis results noting an essentially, she has an A2 level of 3.6% and the upper level of normal is 3.5%. She is taking Slow Fe once a day as of now, but no further interventions were recommended at this time. I wanted to make sure there wasn't anything I was missing in terms of further follow up or testing. I feel like it is so minimally elevated that there really wouldn't be much to do, but wanted to make sure.     Dr Hunt recommended a review of family history and prior OB history to decide if any further intervention was needed.     Past family history is notable for:  Family History   Problem Relation Age of Onset     No Medical Problems Mother      No Medical Problems Father      No Medical Problems Sister      No Medical Problems Brother      No Medical Problems Daughter      No Medical Problems Son      Diabetes Maternal Aunt      Diabetes Maternal Grandmother      Her genetic history is negative except for her brother who  at age 17 because of a patient reported \"nerve problem\".     Her OB history is as follows:   OB History      Para Term  AB Living    3 2 2   2    SAB TAB Ectopic Multiple Live Births        2        Living status at delivery: Living   Current living status: Living   Birth weight: 3200 g    labor during pregnancy: No   Sex: Male   Labor complications: None            Observed anomalies, comments: Camaroon   Baby name: Moshe Cao   Postpartum course uneventful: Yes                1   Outcome: Term       Date: 11      GA: 40w0d      Sex: F    Delivery: Vag-Spont      Living: ANG    Name: Ibis Lopes         Living status at delivery: Living   Current living status: Living   Birth weight: 3100 g   Sex: Female   Labor complications: None            Observed anomalies, comments: [place]aroon; 40+weeks   Baby name: Ibis Lopes   Postpartum course " uneventful: Yes        Both births were in [place] and patient recently immigrated to the [place] in [place].    In review of her chart, Anabelle has a negative family and obstetric history but would recommend specific questions to patient at her next OB visit.    SUPA Smith CNM   11/7/2017 9:07 PM

## 2021-06-14 NOTE — PROGRESS NOTES
Anabelle Herring is here with a professional  for a routine prenatal visit at 23w4d.  She has no concerns and is feeling well.  Reviewed recommendation for  to have thalassemia cascade drawn--info written down for pt.  She is feeling fetal movement regularly. Denies vaginal bleeding/loss of fluid.  Fetal survey ultrasound results reviewed today; normal, it;s a girl! Appetite is normal. Interval weight gain is appropriate.   Discussed 28 week labs/screening for gestational diabetes, anemia, and syphillis are recommended at her next visit.  Discussed the process of 1 hour GCT in detail, as with previous pregnancies, she had a random BS testing to screen for GDM.  Handouts reviewed and provided on glucose testing, breastfeeding and TdaP during pregnancy.  Scheduled her next appointment via telephone with our scheduling line 1/15 with Agueda @ 5070.  Anticipatory guidance, warning signs, when to call and CNM contact information reviewed.  RTC in 4 weeks.

## 2021-06-14 NOTE — PROGRESS NOTES
"Anabelle Jayla Herring is here with a professional  for a routine prenatal visit at 19w4d.  She has c/o cold symptoms.  Discussed comfort measures and will send tylenol rx to the pharmacy.  Reviewed how to take and not to exceed 4,000 mg/24 hours.  Denies vaginal bleeding.  She is unsure if quickening has occurred yet.  Fetal movement felt and heard while auscultating FHR.  Quad screen reviewed.  Pap results reviewed and reviewed the recommendation for a colpo.  Referral placed and appointment scheduled.  Reviewed abnormal hemoglobinopathy.  Re-discussed her family history.  She notes that her son, at 2 months old had severe anemia and received a blood transfusion. She states he had two other anemia \"crises\" that appeared when he had a UTI.  When asked, she states that he does not have sickle cell disease.  Discussed having her 's blood tested.  She states that he would agree to this.  Will review info with Dr Hunt and get further recommendations and will communicate this to Anabelle via a written letter; specifically, the blood work he should have.   She verbalizes understanding. Appetite is normal. Interval weight gain is appropriate.  Reviewed weight gain chart with patient.  Ultrasound referral for fetal survey discussed ordered today and scheduled for her..  Reviewed and provided patient with handouts. Anticipatory guidance, warning signs, when to call and CNM contact information reviewed.  RTC in 4 weeks.      "

## 2021-06-15 NOTE — PROGRESS NOTES
"Anabelle Herring is here with Danish  for a routine prenatal visit at 27w2d.  She has c/o left leg/back pain/soreness.  Has been in her left lower back and when she lays down her left leg feels \"more heavy.\"  She has used ice - doesn't really help.  Discussed stretching, ice/heat, etc.  Also chiro vs. PT.  She accepts PT referral.  This issue did start with the previous pregnancy and has never really resolved but is more exacerbated by pregnancy.  We will have her see specialty scheduling after this visit.  She is feeling fetal movement regularly. Fetal maturity and expectations for fetal movement in the third trimester, how and when to do fetal kick counts and when to call CNM discussed. Appetite is normal. Interval weight gain is appropriate.  28 week labs (1 hour GTT, Hgb & RPR) obtained today.  Risks and benefits of TdaP during pregnancy at 27-36 weeks discussed and given.   Reviewed resources for CBE.Anticipatory guidance, warning signs (with emphasis on  labor signs and symptoms), when to call and CNM contact information reviewed.   "

## 2021-06-16 PROBLEM — Z11.3 ROUTINE SCREENING FOR STI (SEXUALLY TRANSMITTED INFECTION): Status: ACTIVE | Noted: 2018-06-01

## 2021-06-16 PROBLEM — R12 HEARTBURN: Status: ACTIVE | Noted: 2018-07-06

## 2021-06-16 PROBLEM — B97.7 HIGH RISK HUMAN PAPILLOMA VIRUS (HPV) INFECTION OF CERVIX: Status: ACTIVE | Noted: 2017-11-10

## 2021-06-16 PROBLEM — Z71.9 ENCOUNTER FOR COUNSELING: Status: ACTIVE | Noted: 2018-08-03

## 2021-06-16 PROBLEM — N72 HIGH RISK HUMAN PAPILLOMA VIRUS (HPV) INFECTION OF CERVIX: Status: ACTIVE | Noted: 2017-11-10

## 2021-06-16 PROBLEM — Z65.8 INSUFFICIENT SOCIAL SUPPORT: Status: ACTIVE | Noted: 2018-08-03

## 2021-06-16 PROBLEM — Z63.79 STRESSFUL LIFE EVENTS AFFECTING FAMILY AND HOUSEHOLD: Status: ACTIVE | Noted: 2018-08-03

## 2021-06-16 PROBLEM — N83.202 CYST OF LEFT OVARY: Status: ACTIVE | Noted: 2017-11-03

## 2021-06-16 PROBLEM — Z71.9 HEALTH COUNSELING: Status: ACTIVE | Noted: 2018-08-03

## 2021-06-16 NOTE — PROGRESS NOTES
Anabelle Herring is a 32 y.o. female  at 31w6d weeks doing well. Here with her son and visit done on the phone with the assistance of a professional Azeri .  She states that her back and leg pain has improved since last month but she did not go to any physical therapy because she did not know where to go.  When I looked at the referral it looks like they did try to contact her 3 times and sent a letter but she states she does not think she received a letter and she had difficulty communicating with them on the phone to schedule appointment.  I did give her a phone number to call for physical therapy here in [place] and that she can call when she is ready and has a time to schedule appointment.  We also discussed weight gain in pregnancy.  The patient has had a total weight gain of 11 pounds.  I discussed the goal of 25-35 pounds in pregnancy.  The patient states that she has enough food and feels like she eats enough but will prioritize more snacks and food with her meals. Denies any warning s/sx. GFM.  She is otherwise feeling well.  Plan is to RTC 2 weeks. [facility] helped schedule her prior to her leaving today.  I also refilled her prescription for iron and a prenatal vitamin per her request today.

## 2021-06-16 NOTE — PROGRESS NOTES
"Anabelle Herring is a  here with a French  for 36 week and 6 day for routine OB appointment.  She denies any warning signs or symptoms, and notes good fetal movement.  We reviewed her hemoglobin of 12.2 and her negative GBS.  She denies any other questions or concerns.  She states that last Friday she had some abdominal cramping but that went away after a few hours.  She did have a vaginal exam with her last visit.  The patient states that she has been having trouble taking her prenatal vitamin and that the pill itself is hard to swallow.  We discussed that she could cut the pill in half and take half in the morning and half at night and we also discussed the option of coming prenatal vitamins.  The patient would like a prescription for coming prenatals and this was sent to the [facility] pharmacy here at [facility].  The patient's  is here with her today, although he is in the waiting room.  She states \"I do want him to come in here he will ask to many questions and then will have to talk about that all day\".  Anabelle denies any concerns for domestic abuse or violence but more so noting that her  has never really been a part of the prenatal care and thinks he will \"be bothersome\", laughing as she says this.  She states that he has never been in the delivery room at the time of birth because in [facility], where she is giving birth before, she states that there is almost a superstition about men being present during deliveries and that they are \"sent out to wander around into the baby is born\".  We discussed that her  is welcome and the delivery room but we can do whatever makes her comfortable.  We discussed on-call [facility] phone numbers and I also gave her the phone number for [facility].  We discussed reasons to call and that the preferences that she gives the [facility] on-call a phone call when she is in labor so that we can plan to meet her there.  We also " discussed interpretation options while she is there including the [place] and professional .  The patient is nervous about having a male  and we did assure her that she can decline a male  if she does not want that.  Plan is to return to clinic in 1 week.

## 2021-06-16 NOTE — PROGRESS NOTES
Anabelle Herring is here for her 33w6d routine Ob appointment. She is here alone and Indonesian  is on the phone to translate for appintment. Patient has no questions or concerns. Discussed mode for feeding for baby. Patient planning on breastfeeding & bottle feeding. Discussed trying to exclusively breastfeed. Patient does want a breast pump, discussed that we will get one for her when she deliver at the hospital. Patient plans to return to work at 3 months and discussed with the pump she can work on her supply and ability to feed baby with breast milk when she returns to work. She was unaware of the breast pump as this wasn't available to her with her first two babies. Discussed also seeing lactation consultant in hospital or CNM lactation consultant after delivery. Patient also states she doesn't have clothes for her baby, patient given a few items of clothing for baby. Patient declines doing EPDS. She is feeling baby move normally & denies signs & symptoms of  labor. Patient has o/c CNM phone numbers and knows to call with any questions or concerns. FHT, Leopold's, & fundal height WNL.    Keke Fu WN student  I saw this patient with WEI Fu RN, SWDEBBIEP, was present for the exam and counseling and am in agreement with the above A&P.  SUPA Smith, NATY   3/2/2018 11:30 AM

## 2021-06-16 NOTE — PROGRESS NOTES
"Anabelle Herring is here with a French  for her 35w6d routine ob appointment. She is requesting a prescription for tylenol which she received before from [place] CNM. She is currently having a headache and is out of tylenol. Discussed signs & symptoms of preeclampsia; headache, blurred vision, right upper quadrant pain, & edema. Patient denies symptoms other than headache. Tylenol prescription given x45 tabs. Instructed to take 1000 mg up to four times a day but to call the CNM on call if her headache was not improving in the next 48 hours.  Went over o/c CNM number and when to call O/C CNM in labor. Denies regular ctx. She is feeling baby move often.GBS swab collected. Patient unsure if she had GBS in other pregnancies or if it was even checked. Discussed that we will discuss results at next visit and that if she is positive she will need antibiotics in labor or with srom. Leopold's head down, FHT in left lower quadrant and patient feeling kicks up in upper abdomen. Sve cls/thk/high. Outline of fetal scalp felt through lower uterine segment by examiner; Recommended ultrasound for confirmation of head down. Patient declines at this time, states \"maybe I will want at a later time.\"  Fundal height, Leopold's & FHT all WNL. Hgb collected today. Patient will return in one week for routine OB appointment.     Keke IZAGUIRRE student  I saw this patient with WEI Fu RN, SWDEBBIEP, was present for the exam and counseling and am in agreement with the above A&P.  SUPA Smith CNM   3/16/2018 11:03 AM    "

## 2021-06-17 NOTE — PROGRESS NOTES
Anabelle Herring is here with a German  for her 37w6d appointment. She denies ctx or leaking of fluid. She is feeling baby move normally. Went over birth control options of IUD (Paraguard & Mirena), Nexplanon, oral contraceptive & provided education on the differences between methods. She is still undecided at this time on what she wants to use post-partum, is thinking something other than the pill as she forgets this. Patient does not have a car seat yet. Discussed if she needed assistance, says her  will take care of this. She states she does have a car seat at home but she doesn't know if it's  or not. She plans to check this and discuss at next visit. Discussed what she needs at the hospital for supplies & supplies she needs for baby at home. Discussed getting breast pump in hospital, she is worried about supply issues she has had before. Discussed BF support while at hospital & when she goes home in outpatient setting. Reviewed hemoglobin and GBS results.    Keke IZAGUIRRE student    I saw this patient with WEI Fu RN, SWHNP, was present for the exam and counseling and am in agreement with the above A&P. I have placed a request with [employer] in their Car Seat Program in case the car seat that she has at home isn't for a . Patient has a picture of a car seat but difficult to tell if it'll be safe for a . She state that car seats are a 'new' thing for her as she didn't need to use one in [facility]. I also gave her the email to the [place] [employer] for car seat program for [facility]. Anabelle gave me permission to request a car seat for her on her behalf through [employer].     SUPA Smith, NATY   3/30/2018 2:15 PM

## 2021-06-17 NOTE — PROGRESS NOTES
Anabelle Herring is here with son and  for a routine prenatal visit at 38w6d.  She has no concerns and is feeling well.  Denies any regular contractions, LOF or vaginal bleeding.   Fetal movement is normal. Interval weight gain is approriate. Group B strep was negative.  Encouraged to schedule weekly visits from to/through her EDB.   Anticipatory guidance, signs of symptoms of labor or SROM, third trimester warning signs, when to call and CNM contact information reviewed.  Patient had Blue Plus Card scanned at  today for [employer] to provide car seat.

## 2021-06-18 NOTE — PROGRESS NOTES
6 week PP exam    Patient ID: Anabelle Herring is a 33 y.o. female.  Assessment:   Normal postpartum exam at ~6 weeks postpartum.   lactating and pumping occasionally but primarily exclusively breastfeeding  Poor social support  Possible PP anxiety  Hx of HR HPV with negative Colposcopy.    Plan:    1. Pap smear not done at today's visit. Due for annual Gyn exam in 12/2018 - Per Dr. Candelaria - negative Pap smear. Patient should schedule repeat Pap smear 1 year from 12/2017.  2. Desired contraception: IUD. Appointment scheduled today for next week.  3. Discussed resumption of exercise and setting a goal to return to pre-pregnancy weight in the next 6-12 months.   4.  Resumption of intercourse reviewed with possible changes in libido and vaginal lubrication while nursing.  5.  Nutrition and supplements reviewed.  Advised continuation of a prenatal or multivitamin, also Vitamin D3 5000 IU geltab daily and an omega 3 fatty acid supplement.  6. Adjustment to parenting, self care and open communication with her support system discussed. Warning signs and symptoms related to postpartum mood disorders reviewed.   7. Letter written for patient to support the need for help. She states she applied for a visitor's visa for her mother to come help her and feels a letter might help her case. Letter written that describes her situation and how she would benefit from the extra help due to social isolation and poor social support systems here.   8. Discussed the importance of calling us if feeling overwhelmed or upset. Emphasized the importance of keeping her and her children safe. She states she does not feel like hurting herself or others and reiterated the importance of telling us of this changes.  9. Discussed some ways to potentially increase the fat/protein intake during feeds. Discussed use of pacifiers if she feels the baby is using her for pacifying after being fed. Discussed baby wearing as another way to  "offer soothing touch to the baby during the day without feeding. Encouraged her to call and make a lactation appointment as needed.    Total time spent with patient 60 minutes, >50% counseling, education and coordination of care.    SUPA Smith CNM   5/25/2018 12:33 PM      Subjective:     Anabelle Herring is a 33 y.o. female who presents for postpartum visit. She is 6 weeks postpartum following a NSVB.  I have fully reviewed the prenatal and intrapartum course. The delivery was at Term and 39w5d weeks gestation Her baby girl is named Magdalena and weighed 7 lbs 11.1 oz at birth.     Postpartum course has been stable. Baby's course has been stable. Baby is feeding breast mostly and some pumping.  Lochia ceased at 4 weeks postpartum.  She states \"at home, they go in and scrape out the blood after birth\" and so she feels like she bled more after this birth but understands that this didn't happen here in the [place] and that bleeding postpartum is normal. Bowel function is normal. Bladder function is normal. She has not resumed intercourse. Desired contraception: IUD. Sims postpartum depression screening score: was not fully completed as she felt like some of the questions didn't fit her. She states she has anxiety and is worried but has reasons to feel this way as she doesn't have a lot of support at home and is taking care of 3 children by herself. She states that she doesn't sleep well but it's because she doesn't have time to do so because there is too much to do with the kids. She states her mother checks in on her everyday and is worried about her. She states she feels overwhelmed. She states that even the older children are asking for a lot of help and get upset with her if she doesn't have food ready or prepared for them.  She wants her mother to come to visit to help her with the kids. She states that before, when she was in [place], she had a lot of help from neighbors and friends and " "now, since being somewhat new here, it is hard to find someone to help her or that she could pay to help her. She has not resumed regular exercise. Patient stays at home with the children for now.    She states that she applied for a visitor's visa for her mother to come to help her. She states she feels very overwhelmed and really needs some help. She states her  works 10+ hours a day and she states that he is \"very traditional\" and doesn't help much in the way of childcare or housekeeping.    She states that the baby is breastfeeding \"constantly\". She states it seems like the baby is always hungry and she feels like as soon as she finishes feeding the baby, the baby then poops and then is hungry again. She states that the baby has been following the growth curves normally and no concerns noted from the pediatrician. The baby has a 2 month follow up on June 13th.    Review of Systems  General:  Denies problem  Eyes: Denies problem  Ears/Nose/Throat: Denies problem  Cardiovascular: Denies problem  Respiratory:  Denies problem  Gastrointestinal:  Denies problem, Genitourinary: Denies problem  Musculoskeletal:  Denies problem  Skin: Denies problem  Neurologic: Denies problem  Psychiatric: notes feeling overwhelmed, poor social support  Endocrine: Denies problem    Objective:         Physical Exam:  General Appearance: Alert, cooperative, no distress, appears stated age  Skin: Skin color, texture, turgor normal, no rashes or lesions  Throat: Lips, mucosa, and tongue normal; teeth and gums normal  HEENT: grossly normal; otoscopic and opthalmic exam not performed.   Neck: Supple, symmetrical, trachea midline, no adenopathy;  thyroid: not enlarged, symmetric, no tenderness/mass/nodules  Lungs: Clear to auscultation bilaterally, respirations unlabored  Breasts: No breast masses, tenderness, asymmetry, or nipple discharge.  Heart: Regular rate and rhythm, S1 and S2 normal, no murmur, rub, or gallop  Abdomen: Soft, " non-tender.  Incision NA  Pelvic:External genitalia normal without lesions or irritation. Vagina and cervix show no lesions, inflammation, discharge or tenderness. No laceration degree no laceration well approximated and well healed.   No cystocele, No rectocele. Uterus fully involuted.  No adnexal mass or tenderness.  Extremities: Extremities normal without varicosities or edema       Last Pap: 11/3/17. Results were: normal  Immunization History   Administered Date(s) Administered     Tdap 01/15/2018     Immunization status: up to date and documented, stated as current, but no records available

## 2021-06-18 NOTE — PROGRESS NOTES
"Subjective:      Chief Complaint: IUD insertion - Mirena    HPI:  Anabelle Herring is a 33 y.o. female is here for an IUD insert. She is wanting a Mirena IUD. She has not had intercourse since giving birth. She states that she wants the Mirena IUD.    A verbal and written consent on the IUD, including alternative contraceptive methods, was done with a phone  present. Risks and benefits discussed including but not limited to spontaneous uterine expulsion, uterine perforation at the time of insertion or remote from insertion. We reviewed typical efficacy and side effects.  There are no contraindications to IUD use, and she elected to proceed with insertion.     She has a negative urine pregnancy test today.    A recent Pap smear was normal.    STD testing for gonorrhea and chlamydia was obtained today.    Review of Systems  Pertinent items are noted in HPI.  A 12 point comprehensive review of systems was negative except as noted.     The patient's past medical, surgical and family history are not pertinent to this visit.     Objective:     /66 (Patient Site: Right Arm, Patient Position: Sitting, Cuff Size: Adult Regular)  Pulse 88  Ht 5' 5.5\" (1.664 m)  Wt 160 lb 11.2 oz (72.9 kg)  LMP 07/08/2017 (Exact Date)  Breastfeeding? Yes  BMI 26.34 kg/m2    General Appearance:    Alert, cooperative, no distress, appears stated age   Head:    Normocephalic, without obvious abnormality, atraumatic   Eyes:    PERRL, conjunctiva/corneas clear, EOMs grossly intact       Nose:   Nares normal, septum midline, mucosa normal, no drainage   Throat:   Lips, mucosa, and tongue normal; teeth and gums normal           Cardiovascular:     Respirations unlabored                    Pelvic: external genitalia appear normal. No lesions. No abnormal discharge. Vaginal rugae pink, appears healthy. Cervix cleansed with Betadine. Uterus sounded to 6 cm. IUD inserted without difficulty. String visible and trimmed " to 2 cm in length. Patient tolerated procedure well. Educated patient on how to check strings & to call if unable to feel them or if feeling plastic in the cervix.                Skin:   Skin color, texture, turgor normal, no rashes or lesions       Neurologic:  Psych:   Alert & Oriented x4   Normal speech. Good eye contact. Does not appear anxious or depressed        IUD Insertion Procedure Note    Pre-operative Diagnosis: Healthy well woman    Post-operative Diagnosis: same    Indications: contraception    Procedure Details   Urine pregnancy test was done 06/01/2018 and result was negative. The risks (including infection, bleeding, pain, and uterine perforation) and benefits of the procedure were explained to the patient and Verbal & written informed consent was obtained. IUD post-procedure handout given & discussed with patient prior to insertion. A French  was used over the phone to discuss handout, consent, & procedure.    Cervix cleansed with Betadine. Uterus sounded to 6 cm. IUD inserted without difficulty. String visible and trimmed to 2 cm in length. Patient tolerated procedure well. Educated patient on how to check strings & to call if unable to feel them or if feeling plastic in the cervix.     IUD Information:  Mirena, Lot # [id number], Expiration date 11/2020.    Condition:  Stable    Complications:  None       Assessment:        1. Pre-procedure lab exam    2. Routine screening for STI (sexually transmitted infection)    3. Encounter for insertion of mirena IUD        Plan:    -The patient was advised to call CNM on call for any fever or for prolonged or severe pain or bleeding. She was advised to use Ibuprofen or acetaminophen as needed for mild to moderate pain.   -Patient will follow-up in 4-6 weeks for IUD check.  -Patent knows to use back up birth control i.e. Condoms for 1 week after Mirena insertion.    Keke Fu ISAIAH student    I saw this patient with WEI Fu RN, SWHNP, was  present for the exam and counseling and am in agreement with the above A&P.     SUPA Smith CNM  06/01/2018  11:44 AM

## 2021-06-19 NOTE — LETTER
Letter by Angelito Aquino MD at      Author: Angelito Aquino MD Service: -- Author Type: --    Filed:  Encounter Date: 7/25/2019 Status: (Other)         July 25, 2019     Patient: Anabelle Herring   YOB: 1985   Date of Visit: 7/25/2019       To Whom it May Concern:    Anabelle Herring was seen in my clinic on 6/4/19. 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 Please allow Anabelle to participate in class.    If you have any questions or concerns, please don't hesitate to call.    Sincerely,         Electronically signed by Angelito Aquino MD

## 2021-06-19 NOTE — PROGRESS NOTES
"Subjective:      Chief Complaint: Mood check in    HPI:  Anabelle Herring is a 33 y.o. female is here with her  and a professional [place]  as a follow up to check in on her mental health status postpartum. At her last visit for her IUD check, the patient stated that she felt very overwhelmed and \"did not know what to do\", concerning childcare and household management.  The patient states that at that last appointment, she had just found out that her mother would not be able to come to the [place] and she states that this had affected her very much so making her feel very stuck in and help the situation.  She states that since that time she has had a few people help her out with the kids here and there and then also they have had a guest from [facility] staying with them and he has been actually helpful in complaint with the children so that she can get some sleep and do some work around the house.  Since that she is working on getting an appointment to get her mom to come to the [place] but she does not know when this appointment can even happen.  She states that she cannot even get an appointment and so therefore has no idea what her mom might be able to come.     The patient states that she is tired because she goes to bed fairly late, and then wakes up to feed the baby throughout the night, and then her son wakes up at 630 or 7 AM and immediately has needs.  She states that her  works pretty late and works from about 3:30 PM till 1 in the morning and that she often wakes up when he gets home. She states that her  is also going to go back to school and will go to school from 7 am to 3 pm and then go to work after that.     The patient denies any thoughts to hurt herself or others.  She states that sometimes she feels stuck and help the situation but never thinks about leaving it or trying to get out any thoughts of suicide.  She states that she feels overwhelmed and " "wishes she had family around help but does not feel depressed, anxious, or wanting to hurt her self or others.    The patient states that she has WIC services and has support from this program.    EPDS today is 7; 0 for question 10    Review of Systems  Pertinent items are noted in HPI.  A 12 point comprehensive review of systems was negative except as noted.     The patient's past medical, surgical and family history are not pertinent to this visit.     Objective:     /76 (Patient Site: Right Arm, Patient Position: Sitting, Cuff Size: Adult Regular)  Pulse 76  Resp 18  Ht 5' 5.5\" (1.664 m)  Wt 161 lb 6.4 oz (73.2 kg)  Breastfeeding? Yes  BMI 26.45 kg/m2    General Appearance:    Alert, cooperative, no distress, appears stated age   Head:    Normocephalic, without obvious abnormality, atraumatic   Eyes:    PERRL, conjunctiva/corneas clear, EOMs grossly intact       Nose:   Nares normal, septum midline, mucosa normal, no drainage   Throat:   Lips, mucosa, and tongue normal; teeth and gums normal           Cardiovascular:     Respirations unlabored                                   Skin:   Skin color, texture, turgor normal, no rashes or lesions       Neurologic:  Psych:   Alert & Oriented x4   Normal speech. Good eye contact. Does not appear anxious or depressed           Assessment:        1. Encounter for counseling    2. Health counseling    3. Insufficient social support    4. Stressful life events affecting family and household            Plan:     1.  I reviewed with the patient why I had her come in today.  I discussed that I had concerns for her mental health and her social situation based on statements and her mood at her IUD check.  The patient states that she is not suicidal or homicidal and does not feel depressed or anxious, just very overwhelmed with her home life situation.  I advised the patient to please call or reach out to the midwives if she feels like she is suicidal or homicidal or " call emergency services in that case or she feels like she is developing symptoms of depression or anxiety.  I did review that there are medications available if she is feeling anxious or depressed and the patient states that she does not feel that this is what is happening and does not want to start any medications.  2.  I reviewed with the patient that she does have [place] services and if there is anything else that we can do to support the social service to please let us know.  3.  I discussed that I am happy to provide any further documentation or statements about the need for her mother to come from [facility] to help her.  4.  I discussed that she should try to get as much help as she can from friends, family, and Buddhist members even if it is just for a few hours so that she can sleep and take care of herself as well.  5.  Patient should return as needed for further counseling, need for help in follow-up or any other reason.    Total time spent with patient 45 minutes, >50% counseling, education and coordination of care.    SUPA Smith CNM   8/3/2018 10:03 AM

## 2021-06-19 NOTE — LETTER
Letter by Angelito Aquino MD at      Author: Angelito Aquino MD Service: -- Author Type: --    Filed:  Encounter Date: 2019 Status: (Other)         Norma Atkins, GENTRY  [address] [address]                                  2019    Patient: Anabelle Herring   MR Number: [id number]   YOB: 1985   Date of Visit: 2019     Dear Dr. Atkins, CNP:    Thank you for referring Anabelle Herring to me for evaluation. Below are the relevant portions of my assessment and plan of care.    If you have questions, please do not hesitate to call me. I look forward to following Anabelle along with you.    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 From [place]  2. In [place] x [place] yr[place]  3. No TB hx or exposure hx.  4. Working as a CRNA in [employer]  5. 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 CXR neg. No symptoms.  Lifetime risk of TB about 5 %.  Agrees to preventive Rx  Recommendations:   q Chao x 12 weeks  Rifapentin 900 mg q Chao x 12 weeks  Counciled regarding side effects.  RTC 3 mos so I can write a letter confirming Rx completed    Thanks for asking me to participate in this patients care.  Angelito Aquino       Chief Complaint Pos QFN TB test.       HPI   We have been requested by Dr. Skyla Khoury  to evaluate Anabelle Herring for pos QFN TB Test.  Hx: working at  [employer]. Screened for TB. PPD ecquivocal. 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 No sx.  No exposure.  From [place]. HIV neg. Has 3 kids. Uses IUD.    HBV, HCV neg.     Medical History  Active Ambulatory (Non-Hospital) Problems    Diagnosis   ? Insufficient social support   ? Stressful life events affecting family and household   ? Health counseling   ? Encounter for counseling   ? Heartburn   ? Routine screening for STI (sexually transmitted infection)   ? High risk human papilloma virus (HPV) infection of cervix   ? Cyst of left ovary     Past Medical History:   Diagnosis Date   ? Varicella      [facility] reviewed, updated, w/o changes. Surgical History  She  has no past surgical history on file.   Social History  Reviewed, and she  reports that she has never smoked. She has never used smokeless tobacco. She reports that she does not drink alcohol or use drugs.   Allergies  No Known Allergies Family History  Reviewed, and family history includes Anemia in her son; Diabetes in her maternal aunt and maternal grandmother; No Medical Problems in her brother, daughter, father, mother, and sister.        Prior to Admission Medications   Current Outpatient Medications on File Prior to Visit   Medication Sig Dispense Refill   ? levonorgestrel (MIRENA) 20 mcg/24 hours (5 yrs) 52 mg IUD 1 each by Intrauterine route once.     ? [DISCONTINUED] acetaminophen (TYLENOL EXTRA STRENGTH) 500 MG tablet Take 2 tablets (1,000 mg total) by mouth every 6 (six) hours as needed for pain. 45 tablet 0   ? [DISCONTINUED] calcium carbonate (TUMS) 300 mg (750 mg) Chew Chew 1-2 tablets 4 (four) times a day. 60 tablet 1   ? [DISCONTINUED] ferrous sulfate SR (SLOW FE) 142 mg (45 mg iron) TbER Take 142 mg by mouth daily. 90 tablet 3   ? [DISCONTINUED] levonorgestrel (MIRENA) 20 mcg/24 hours (5 yrs) 52 mg IUD 1 each by Intrauterine route once.     ? [DISCONTINUED] prenatal vit no.128-iron-folic (PRENATAL VITAMIN) 28 mg iron- 800 mcg Tab Take 1 tablet by mouth  daily. 90 tablet 3   ? [DISCONTINUED] prenatal vitamin gummy 400 mcg-35 mg -25 mg-5 mg Chew Chew 1 each daily. 1 each 3   ? [DISCONTINUED] ranitidine (ZANTAC) 150 MG tablet Take 1 tablet (150 mg total) by mouth 2 (two) times a day. 60 tablet 2     No current facility-administered medications on file prior to visit.           Review of Systems:  Review of systems:  Eyes: no burning, dry eyes, diplopia  ENT: no sinus pain, sore throat  Neck: no stiffness or swelling  Resp: no cough, sputum, hemoptysis  Cv: no chest pain, ADAM, PND, edema  GI: no nausea, vomiting, diarrhea, blood in stool  : no dysuria, blood in urine, urine retention.  Musculoskelatal: no joint swelling, bunions, vertebral pain  Neurology: no weakness, numbness, nerve pain.    Social hx, family hx reviewed, unchanged and noncontributory.   Physical Exam:  /72 (Patient Site: Right Arm, Patient Position: Sitting, Cuff Size: Adult Regular)   Pulse 80   Temp 98.2  F (36.8  C)   Wt 141 lb (64 kg)   BMI 23.11 kg/m         Looks fine.  Nodes: no cervical, axillary or inguinal adenopathy  ENT: TMs clear, oropharynx without exudate or thrush  Respiratory: normal respiratory patter, no rales or rubs.  CV: normal heart tones. No rub, murmur or S3. No JVD.  Abdmomen: soft, normal bowel sounds, non-tender, no masses or   organomegaly  Extremities: normal venous pattern. Good pulses. No edema. No joint effusions.  Neurologic: oriented x 3. Cranial nerves 2-12 intact. No focal weakness or sensory loss.       Pertinent Labs  Lab Results:personally reviewed.   No results found for: NA, K, CO2, BUN, CREATININE, GLUCOSE, CALCIUM  Lab Results   Component Value Date    WBC 5.2 10/25/2017    HGB 13.3 04/13/2018    HGB 12.0 03/16/2018    HGB 12.3 01/15/2018    HCT 31.2 (L) 10/25/2017    MCV 86 10/25/2017     10/25/2017        Pertinent Radiology  Radiology Results: Personally reviewed image/s  Microbiology:none

## 2021-06-19 NOTE — PROGRESS NOTES
Subjective:      Chief Complaint: IUD check    HPI:  Anabelle Herring is a 33 y.o. female is here for an IUD check. She is here with a professional Hebrew .     Anabelle eHrring is a 33 y.o. female who presents for IUD check. This was inserted on 6/1/18. The patient has no complaints today. The patient is not currently sexually active. She has not checked her IUD strings. She states that she has been bleeding since insertion. She states that she bleeds daily and describes it as heavier than a spotting but not quite as heavy as a period. She states that's she changes her pad 2 times a day but it isn't saturated or full, just maybe a 3-4 cm spot of blood collected throughout the day. She denies any headaches, lightheadedness, shortness of breath.     Anabelle also mentions a sensation of chest tightness.  She states that sometimes she feels a sense of heaviness in her chest and that if she goes and lays down or rests it will go away.  She states that she does not have any associated shortness of breath, changes to her breathing patterns, the pain does not radiate anywhere in her chest or down her arm, she denies any nausea, vomiting, diarrhea or constipation.  She denies any history of asthma, difficulty breathing, allergies.  She does not feel that this heaviness sensation is necessarily associated with food.  She does recall that she has been on an antacid in the past when [place] was in [place] and that it helped sometimes.    The patient feels that sometimes she feels overwhelmed.  She states that she does worry but is unsure if she worries more than what is normal.  She states that she does feel like she worries more now with 3 kids than she did previously with 2.  She states that she does not have help at home.  She states that she was not able to get her mother to come over here to help her with 3 children.  The patient does stay at home all day with 3 children and  "does not have any other help.  She states she has not really left the house much in the last few months since giving birth.  She feels like she is overwhelmed with a lot of the housework and child rearing.  She states that she feels that it would be this way into the kids are older and can take care of themselves a little bit more.  She describes a somewhat futile perspective on her home life until the kids are just older and can do more on their own.    She denies any SI or HI. She states that she is reluctant to see any psychiatric provider because she thinks they will \"just make me take a mention medication\".  She has not really put much thought or consideration and to a possible talk therapy relationship with someone.  She is willing to come back and discuss this in more detail in 3-4 weeks with me.  I discussed that we would also check in on her bleeding on the IUD as well.    Review of Systems  Pertinent items are noted in HPI.  A 12 point comprehensive review of systems was negative except as noted.     The patient's past medical, surgical and family history are not pertinent to this visit.     Objective:     /72 (Patient Site: Right Arm, Patient Position: Sitting, Cuff Size: Adult Regular)  Pulse 68  Ht 5' 5.5\" (1.664 m)  Wt 160 lb 4.8 oz (72.7 kg)  Breastfeeding? Yes  BMI 26.27 kg/m2    General Appearance:    Alert, cooperative, no distress, appears stated age   Head:    Normocephalic, without obvious abnormality, atraumatic   Eyes:    PERRL, conjunctiva/corneas clear, EOMs grossly intact       Nose:   Nares normal, septum midline, mucosa normal, no drainage   Throat:   Lips, mucosa, and tongue normal; teeth and gums normal           Cardiovascular:     Respirations unlabored                        Pelvic:SE only - normal vaginal and mucosal tissue.  No abnormal discharge or odor.  Cervix normal appearance with IUD strings visible x 2 without evidence of IUD itself.  Approximately 2 cm long.      "       Skin:   Skin color, texture, turgor normal, no rashes or lesions       Neurologic:  Psych:   Alert & Oriented x4   Normal speech. Good eye contact. Does not appear anxious. Appears near-tearful when discussing home situation.           Assessment:        1. Heartburn    2. IUD check up: 33 y.o., routine Mirena IUD check -  4 weeks post-insertion, correct placement confirmed.    3. Encounter for routine checking of intrauterine contraceptive device (IUD)          Plan:     1.  Reassurance re: correct placement, normalcy of side effects and that these often lessen with extended use of IUD.   2.  Recommended taking ibuprofen 600 mg [id number] x 5 days to reduce amount and duration of bleeding.    3.  Taught and encouraged to check IUD strings monthly.    4.  Warning signs related to IUD use (PAINS) and when to call CNM reviewed.  5.   Chest Heaviness vs possible anxiety: We discussed the patient's concerns today. She established that she is not having SI or HI. She is willing to come back in 3-4 weeks to discuss options for managing anxiety vs depression and to discuss r/b of possible medications if indicated. Visit scheduled with MA prior to leaving.  6. Rx for Zantac and Tums given. Patient walked to pharmacy and discussed Rx options with pharmacist on staff. Rx sent and filled by [facility].  7. Plan to follow up on bleeding profile on IUD at visit.     Total time spent with patient 30 minutes, >50% counseling, education and coordination of care.    WEI Fu RN student [facility].     I saw this patient with WEI Fu RN, SWHNP, was present for the exam and counseling and am in agreement with the above A&P.   SUPA Smith, NATY   7/6/2018 10:52 AM

## 2021-07-03 NOTE — ADDENDUM NOTE
Addendum Note by Fide Altman APRN, CNM at 11/6/2017 10:17 AM     Author: Fide Altman APRN, CNM Service: -- Author Type: Midwife    Filed: 11/6/2017 10:17 AM Encounter Date: 11/3/2017 Status: Signed    : Fide Altman APRN, CNM (Midwife)    Addended by: FIDE ALTMAN on: 11/6/2017 10:17 AM        Modules accepted: Orders

## 2021-07-03 NOTE — ADDENDUM NOTE
Addendum Note by Ifeanyi Atkins CNP at 5/8/2019 10:00 AM     Author: Ifeanyi Atkins CNP Service: -- Author Type: Nurse Practitioner    Filed: 5/10/2019  5:20 PM Encounter Date: 5/8/2019 Status: Signed    : Ifeanyi Atkins CNP (Nurse Practitioner)    Addended by: IFEANYI ATKINS on: 5/10/2019 05:20 PM        Modules accepted: Orders

## 2021-07-14 PROBLEM — O99.013 ANEMIA DURING PREGNANCY IN THIRD TRIMESTER: Status: RESOLVED | Noted: 2017-11-03 | Resolved: 2018-06-01

## 2021-07-14 PROBLEM — Z34.80 ENCOUNTER FOR SUPERVISION OF OTHER NORMAL PREGNANCY: Status: RESOLVED | Noted: 2017-10-25 | Resolved: 2018-06-01

## 2021-07-14 PROBLEM — Z34.90 PREGNANT: Status: RESOLVED | Noted: 2018-04-12 | Resolved: 2019-04-30

## 2021-07-14 PROBLEM — Z30.09 ENCOUNTER FOR OTHER GENERAL COUNSELING OR ADVICE ON CONTRACEPTION: Status: RESOLVED | Noted: 2018-08-03 | Resolved: 2019-04-30

## 2021-07-14 PROBLEM — Z30.430 ENCOUNTER FOR INSERTION OF MIRENA IUD: Status: RESOLVED | Noted: 2018-06-01 | Resolved: 2019-04-30

## 2021-07-14 PROBLEM — O99.012 ANEMIA DURING PREGNANCY IN SECOND TRIMESTER: Status: RESOLVED | Noted: 2017-11-03 | Resolved: 2017-12-20

## 2021-07-14 PROBLEM — Z30.431 IUD CHECK UP: Status: RESOLVED | Noted: 2018-07-06 | Resolved: 2019-04-30

## 2021-07-14 PROBLEM — Z30.431 ENCOUNTER FOR ROUTINE CHECKING OF INTRAUTERINE CONTRACEPTIVE DEVICE (IUD): Status: RESOLVED | Noted: 2018-07-06 | Resolved: 2019-04-30

## 2023-06-20 ENCOUNTER — OFFICE VISIT (OUTPATIENT)
Dept: FAMILY MEDICINE | Facility: CLINIC | Age: 38
End: 2023-06-20
Payer: COMMERCIAL

## 2023-06-20 VITALS
DIASTOLIC BLOOD PRESSURE: 70 MMHG | HEIGHT: 66 IN | WEIGHT: 160.4 LBS | BODY MASS INDEX: 25.78 KG/M2 | TEMPERATURE: 98.2 F | HEART RATE: 73 BPM | SYSTOLIC BLOOD PRESSURE: 110 MMHG | OXYGEN SATURATION: 100 % | RESPIRATION RATE: 16 BRPM

## 2023-06-20 DIAGNOSIS — Z97.5 IUD (INTRAUTERINE DEVICE) IN PLACE: ICD-10-CM

## 2023-06-20 DIAGNOSIS — Z00.00 ROUTINE GENERAL MEDICAL EXAMINATION AT A HEALTH CARE FACILITY: Primary | ICD-10-CM

## 2023-06-20 DIAGNOSIS — Z12.4 CERVICAL CANCER SCREENING: ICD-10-CM

## 2023-06-20 DIAGNOSIS — Z80.49 FAMILY HISTORY OF UTERINE CANCER: ICD-10-CM

## 2023-06-20 DIAGNOSIS — M79.645 PAIN OF LEFT THUMB: ICD-10-CM

## 2023-06-20 PROBLEM — Z63.79 STRESSFUL LIFE EVENTS AFFECTING FAMILY AND HOUSEHOLD: Status: RESOLVED | Noted: 2018-08-03 | Resolved: 2023-06-20

## 2023-06-20 PROBLEM — Z11.3 ROUTINE SCREENING FOR STI (SEXUALLY TRANSMITTED INFECTION): Status: RESOLVED | Noted: 2018-06-01 | Resolved: 2023-06-20

## 2023-06-20 PROBLEM — Z71.9 ENCOUNTER FOR COUNSELING: Status: RESOLVED | Noted: 2018-08-03 | Resolved: 2023-06-20

## 2023-06-20 PROBLEM — Z71.9 HEALTH COUNSELING: Status: RESOLVED | Noted: 2018-08-03 | Resolved: 2023-06-20

## 2023-06-20 LAB
ANION GAP SERPL CALCULATED.3IONS-SCNC: 14 MMOL/L (ref 7–15)
BUN SERPL-MCNC: 14.2 MG/DL (ref 6–20)
CALCIUM SERPL-MCNC: 9.5 MG/DL (ref 8.6–10)
CHLORIDE SERPL-SCNC: 106 MMOL/L (ref 98–107)
CHOLEST SERPL-MCNC: 213 MG/DL
CREAT SERPL-MCNC: 0.77 MG/DL (ref 0.51–0.95)
DEPRECATED HCO3 PLAS-SCNC: 21 MMOL/L (ref 22–29)
ERYTHROCYTE [DISTWIDTH] IN BLOOD BY AUTOMATED COUNT: 12.4 % (ref 10–15)
GFR SERPL CREATININE-BSD FRML MDRD: >90 ML/MIN/1.73M2
GLUCOSE SERPL-MCNC: 92 MG/DL (ref 70–99)
HBA1C MFR BLD: 5.3 % (ref 0–5.6)
HCT VFR BLD AUTO: 38.2 % (ref 35–47)
HDLC SERPL-MCNC: 71 MG/DL
HGB BLD-MCNC: 13.3 G/DL (ref 11.7–15.7)
LDLC SERPL CALC-MCNC: 129 MG/DL
MCH RBC QN AUTO: 29.8 PG (ref 26.5–33)
MCHC RBC AUTO-ENTMCNC: 34.8 G/DL (ref 31.5–36.5)
MCV RBC AUTO: 86 FL (ref 78–100)
NONHDLC SERPL-MCNC: 142 MG/DL
PLATELET # BLD AUTO: 172 10E3/UL (ref 150–450)
POTASSIUM SERPL-SCNC: 3.9 MMOL/L (ref 3.4–5.3)
RBC # BLD AUTO: 4.47 10E6/UL (ref 3.8–5.2)
SODIUM SERPL-SCNC: 141 MMOL/L (ref 136–145)
TRIGL SERPL-MCNC: 63 MG/DL
TSH SERPL DL<=0.005 MIU/L-ACNC: 0.84 UIU/ML (ref 0.3–4.2)
WBC # BLD AUTO: 4.5 10E3/UL (ref 4–11)

## 2023-06-20 PROCEDURE — 84443 ASSAY THYROID STIM HORMONE: CPT | Performed by: STUDENT IN AN ORGANIZED HEALTH CARE EDUCATION/TRAINING PROGRAM

## 2023-06-20 PROCEDURE — 99213 OFFICE O/P EST LOW 20 MIN: CPT | Mod: 25 | Performed by: STUDENT IN AN ORGANIZED HEALTH CARE EDUCATION/TRAINING PROGRAM

## 2023-06-20 PROCEDURE — 85027 COMPLETE CBC AUTOMATED: CPT | Performed by: STUDENT IN AN ORGANIZED HEALTH CARE EDUCATION/TRAINING PROGRAM

## 2023-06-20 PROCEDURE — G0145 SCR C/V CYTO,THINLAYER,RESCR: HCPCS | Performed by: STUDENT IN AN ORGANIZED HEALTH CARE EDUCATION/TRAINING PROGRAM

## 2023-06-20 PROCEDURE — 80061 LIPID PANEL: CPT | Performed by: STUDENT IN AN ORGANIZED HEALTH CARE EDUCATION/TRAINING PROGRAM

## 2023-06-20 PROCEDURE — 83036 HEMOGLOBIN GLYCOSYLATED A1C: CPT | Performed by: STUDENT IN AN ORGANIZED HEALTH CARE EDUCATION/TRAINING PROGRAM

## 2023-06-20 PROCEDURE — 87624 HPV HI-RISK TYP POOLED RSLT: CPT | Performed by: STUDENT IN AN ORGANIZED HEALTH CARE EDUCATION/TRAINING PROGRAM

## 2023-06-20 PROCEDURE — 99385 PREV VISIT NEW AGE 18-39: CPT | Mod: 25 | Performed by: STUDENT IN AN ORGANIZED HEALTH CARE EDUCATION/TRAINING PROGRAM

## 2023-06-20 PROCEDURE — 80048 BASIC METABOLIC PNL TOTAL CA: CPT | Performed by: STUDENT IN AN ORGANIZED HEALTH CARE EDUCATION/TRAINING PROGRAM

## 2023-06-20 PROCEDURE — 36415 COLL VENOUS BLD VENIPUNCTURE: CPT | Performed by: STUDENT IN AN ORGANIZED HEALTH CARE EDUCATION/TRAINING PROGRAM

## 2023-06-20 ASSESSMENT — PAIN SCALES - GENERAL: PAINLEVEL: NO PAIN (0)

## 2023-06-20 NOTE — PROGRESS NOTES
SUBJECTIVE:   CC: Anabelle is an 38 year old who presents for preventive health visit.       6/20/2023     1:35 PM   Additional Questions   Roomed by Deniz LAYTON   Accompanied by N/A     Healthy Habits:     Getting at least 3 servings of Calcium per day:  NO    Bi-annual eye exam:  Yes    Dental care twice a year:  NO    Sleep apnea or symptoms of sleep apnea:  None    Diet:  Regular (no restrictions)    Frequency of exercise:  None    Duration of exercise:  N/A    Taking medications regularly:  Not Applicable    Barriers to taking medications:  Not applicable    Medication side effects:  Not applicable    PHQ-2 Total Score: 1    Additional concerns today:  Yes    Today's PHQ-2 Score:       6/20/2023     1:24 PM   PHQ-2 ( 1999 [employer])   Q1: Little interest or pleasure in doing things 1   Q2: Feeling down, depressed or hopeless 0   PHQ-2 Score 1   Q1: Little interest or pleasure in doing things Several days   Q2: Feeling down, depressed or hopeless Not at all   PHQ-2 Score 1       Have you ever done Advance Care Planning? (For example, a Health Directive, POLST, or a discussion with a medical provider or your loved ones about your wishes):  Didn't discuss     Social History     Tobacco Use     Smoking status: Never     Smokeless tobacco: Never   Vaping Use     Vaping status: Not on file   Substance Use Topics     Alcohol use: No     Comment: Alcoholic Drinks/day: before pregnancy          View : No data to display.              Reviewed orders with patient.  Reviewed health maintenance and updated orders accordingly - Yes  Lab work is in process  Labs reviewed in EPIC  BP Readings from Last 3 Encounters:   06/20/23 110/70    Wt Readings from Last 3 Encounters:   06/20/23 72.8 kg (160 lb 6.4 oz)   06/04/19 64 kg (141 lb)   05/08/19 67.6 kg (149 lb)                  Patient Active Problem List   Diagnosis     Cyst of left ovary     High risk human papilloma virus (HPV) infection of cervix     Heartburn     Insufficient  "social support     History reviewed. No pertinent surgical history.    Social History     Tobacco Use     Smoking status: Never     Smokeless tobacco: Never   Vaping Use     Vaping status: Not on file   Substance Use Topics     Alcohol use: No     Comment: Alcoholic Drinks/day: before pregnancy     Family History   Problem Relation Age of Onset     No Known Problems Mother      No Known Problems Father      No Known Problems Sister      No Known Problems Brother      No Known Problems Daughter      Anemia Son         blood transfusion at 2 mo of age.  2 other \"anemia crises\" associated with UTIs     Diabetes Maternal Aunt      Diabetes Maternal Grandmother          Current Outpatient Medications   Medication Sig Dispense Refill     levonorgestrel (MIRENA) 20 mcg/24 hours (5 yrs) 52 mg IUD [LEVONORGESTREL (MIRENA) 20 MCG/24 HOURS (5 YRS) 52 MG IUD] 1 each by Intrauterine route once.       No Known Allergies    Breast Cancer Screening:    FHS-7:        View : No data to display.              Pertinent mammograms are reviewed under the imaging tab.    History of abnormal Pap smear: NO - age 30-65 PAP every 5 years with negative HPV co-testing recommended      11/3/2017     3:40 PM   PAP / HPV   PAP Negative for squamous intraepithelial lesion or malignancy  Electronically signed by Fide Parker CT (ASCP) on 11/14/2017 at 11:01 AM         Reviewed and updated as needed this visit by clinical staff   Tobacco  Allergies  Meds              Reviewed and updated as needed this visit by Provider                 History reviewed. No pertinent past medical history.   History reviewed. No pertinent surgical history.    Review of Systems  CONSTITUTIONAL: NEGATIVE for fever, chills, change in weight  INTEGUMENTARU/SKIN: NEGATIVE for worrisome rashes, moles or lesions  EYES: NEGATIVE for vision changes or irritation  ENT: NEGATIVE for ear, mouth and throat problems  RESP: NEGATIVE for significant cough or SOB  BREAST: " "NEGATIVE for masses, tenderness or discharge  CV: NEGATIVE for chest pain, palpitations or peripheral edema  GI: NEGATIVE for nausea, abdominal pain, heartburn, or change in bowel habits  : NEGATIVE for unusual urinary or vaginal symptoms. Periods are regular.  MUSCULOSKELETAL: NEGATIVE for significant arthralgias or myalgia  NEURO: NEGATIVE for weakness, dizziness or paresthesias  PSYCHIATRIC: NEGATIVE for changes in mood or affect     OBJECTIVE:   BP (!) 145/83 (BP Location: Right arm, Patient Position: Sitting, Cuff Size: Adult Regular)   Pulse 73   Temp 98.2  F (36.8  C) (Oral)   Resp 16   Ht 1.676 m (5' 6\")   Wt 72.8 kg (160 lb 6.4 oz)   LMP  (LMP Unknown)   SpO2 100%   BMI 25.89 kg/m    Physical Exam  GENERAL: healthy, alert and no distress  HENT: ear canals and TM's normal, nose and mouth without ulcers or lesions  NECK: no adenopathy, no asymmetry, masses, or scars and thyroid normal to palpation  RESP: lungs clear to auscultation - no rales, rhonchi or wheezes  CV: regular rate and rhythm, normal S1 S2, no S3 or S4, no murmur, click or rub, no peripheral edema and peripheral pulses strong  :   On inspection, no obvious lesions, swelling or odor   Normal pubic hair growth extending to internalthigh bilaterally   Clitoral goodrich intact and urethra midline   Vaginal mucosa healthy and pink   Cervix with some keratotic changes at the 1 o'clock with associated labs of possible blood?  No CMT or significant friability noted.  IUD strings visualized.  No significant vaginal discharge at the posterior fornix   Pap smear collected    ASSESSMENT/PLAN:       ICD-10-CM    1. Routine general medical examination at a health care facility  Z00.00 PAP screen with HPV - recommended age 30 - 65 years     TSH with free T4 reflex     CBC with platelets     Basic metabolic panel  (Ca, Cl, CO2, Creat, Gluc, K, Na, BUN)     Hemoglobin A1c     Lipid Profile (Chol, Trig, HDL, LDL calc)      2. Cervical cancer screening  " "Z12.4       3. Pain of left thumb  M79.645 XR Finger Left G/E 2 Views      4. Family history of uterine cancer  Z80.49       5. IUD (intrauterine device) in place  Z97.5         Patient is a pleasant 38-year-old female with PMH of IUD in place, history of HR HPV who presents for annual.    Patient declined .  She is proficient in English.    Home life: kids x 3 and    Occupation:  admin   Sexually active:yes, no concerns   Safety concerns:no   Diet/exercise:doing ok   Family history of cancers: Patient is on 100% sure but she thinks her mom may have had uterine cancer.  Eye exam/dental exam: needs dental exam   Alcohol use: rarely   Tobacco use/marijuana use/drug use: no   Mental health:tension with son's dianosis. With autism   Vaccines: Declines today  Blood work: Ordered    Menses/menopause: Monthly  Birth control: Mirena placed 2018 and she was told that she needed to get it taken out in 5 years.  Is wondering if she should get it taken out this year.  Advised patient that Mirena has now been extended to 8 years.  If she would like, she can keep it for another 3 years.  Patient is okay with that.  Last Pap smear: 2017- colposcopy unremarkable.  Was told to repeat Pap in 1 year.  Was lost to follow-up.  Pap collected today.      Pain of left thumb:  Ongoing for 1+ month  Is in the DIP joint with flexion  Some pinpoint tenderness to palpation with some enlargement of the joint noted.  We will start with x-ray    Patient has been advised of split billing requirements and indicates understanding: Yes      COUNSELING:  Reviewed preventive health counseling, as reflected in patient instructions      BMI:   Estimated body mass index is 25.89 kg/m  as calculated from the following:    Height as of this encounter: 1.676 m (5' 6\").    Weight as of this encounter: 72.8 kg (160 lb 6.4 oz).   Weight management plan: Discussed healthy diet and exercise guidelines      She reports " that she has never smoked. She has never used smokeless tobacco.      Vikki Parra DO  [facility]

## 2023-06-22 ENCOUNTER — TELEPHONE (OUTPATIENT)
Dept: FAMILY MEDICINE | Facility: CLINIC | Age: 38
End: 2023-06-22
Payer: COMMERCIAL

## 2023-06-22 LAB
BKR LAB AP GYN ADEQUACY: NORMAL
BKR LAB AP GYN INTERPRETATION: NORMAL
BKR LAB AP HPV REFLEX: NORMAL
BKR LAB AP PREVIOUS ABNL DX: NORMAL
BKR LAB AP PREVIOUS ABNORMAL: NORMAL
PATH REPORT.COMMENTS IMP SPEC: NORMAL
PATH REPORT.COMMENTS IMP SPEC: NORMAL
PATH REPORT.RELEVANT HX SPEC: NORMAL

## 2023-06-22 NOTE — TELEPHONE ENCOUNTER
----- Message from Vikki Parra DO sent at 6/22/2023  1:03 PM CDT -----  Call patient x 2 and if no answer, please send letter     Cholesterol levels are elevated but below threshold for starting medications. Would recommend increasing activity levels and continue eating healthy.     Rest of the bloodwork looks good.     Will repeat in 1 year.    Vikki Parra DO

## 2023-06-26 LAB
HUMAN PAPILLOMA VIRUS 16 DNA: NEGATIVE
HUMAN PAPILLOMA VIRUS 18 DNA: NEGATIVE
HUMAN PAPILLOMA VIRUS FINAL DIAGNOSIS: NORMAL
HUMAN PAPILLOMA VIRUS OTHER HR: NEGATIVE

## 2023-07-03 ENCOUNTER — HOSPITAL ENCOUNTER (OUTPATIENT)
Dept: GENERAL RADIOLOGY | Facility: HOSPITAL | Age: 38
Discharge: HOME | End: 2023-07-03
Attending: STUDENT IN AN ORGANIZED HEALTH CARE EDUCATION/TRAINING PROGRAM | Admitting: STUDENT IN AN ORGANIZED HEALTH CARE EDUCATION/TRAINING PROGRAM
Payer: COMMERCIAL

## 2023-07-03 DIAGNOSIS — M79.645 PAIN OF LEFT THUMB: ICD-10-CM

## 2023-07-03 PROCEDURE — 73140 X-RAY EXAM OF FINGER(S): CPT | Mod: LT

## 2023-07-12 ENCOUNTER — TELEPHONE (OUTPATIENT)
Dept: FAMILY MEDICINE | Facility: CLINIC | Age: 38
End: 2023-07-12
Payer: COMMERCIAL

## 2023-07-12 NOTE — TELEPHONE ENCOUNTER
----- Message from Vikki Parra DO sent at 7/11/2023  3:53 PM CDT -----  Good news. It looks like Xray is negative. No arthritis, displacement or fracture noted. I would recommend doing occupational therapy for the pain. Please let me know if you're interested and I will put in the order.      Vikki Parra DO

## 2023-07-12 NOTE — TELEPHONE ENCOUNTER
Called patient and left a generic message for her to return phone call.  Patient needs to be informed of the results/message below.

## 2024-07-21 ENCOUNTER — HEALTH MAINTENANCE LETTER (OUTPATIENT)
Age: 39
End: 2024-07-21

## 2024-10-01 ENCOUNTER — OFFICE VISIT (OUTPATIENT)
Dept: FAMILY MEDICINE | Facility: CLINIC | Age: 39
End: 2024-10-01
Payer: COMMERCIAL

## 2024-10-01 VITALS
OXYGEN SATURATION: 100 % | SYSTOLIC BLOOD PRESSURE: 124 MMHG | BODY MASS INDEX: 27.23 KG/M2 | RESPIRATION RATE: 16 BRPM | TEMPERATURE: 98.4 F | DIASTOLIC BLOOD PRESSURE: 80 MMHG | WEIGHT: 169.4 LBS | HEART RATE: 60 BPM | HEIGHT: 66 IN

## 2024-10-01 DIAGNOSIS — Z97.5 IUD (INTRAUTERINE DEVICE) IN PLACE: ICD-10-CM

## 2024-10-01 DIAGNOSIS — Z80.49 FAMILY HISTORY OF UTERINE CANCER: Primary | ICD-10-CM

## 2024-10-01 DIAGNOSIS — E07.89 THYROID FULLNESS: ICD-10-CM

## 2024-10-01 DIAGNOSIS — N92.0 SPOTTING: ICD-10-CM

## 2024-10-01 DIAGNOSIS — Z00.00 ROUTINE GENERAL MEDICAL EXAMINATION AT A HEALTH CARE FACILITY: ICD-10-CM

## 2024-10-01 LAB
ALBUMIN SERPL BCG-MCNC: 4.5 G/DL (ref 3.5–5.2)
ALP SERPL-CCNC: 43 U/L (ref 40–150)
ALT SERPL W P-5'-P-CCNC: 15 U/L (ref 0–50)
ANION GAP SERPL CALCULATED.3IONS-SCNC: 11 MMOL/L (ref 7–15)
AST SERPL W P-5'-P-CCNC: 31 U/L (ref 0–45)
BILIRUB SERPL-MCNC: 0.9 MG/DL
BUN SERPL-MCNC: 13.3 MG/DL (ref 6–20)
CALCIUM SERPL-MCNC: 9.2 MG/DL (ref 8.8–10.4)
CHLORIDE SERPL-SCNC: 103 MMOL/L (ref 98–107)
CHOLEST SERPL-MCNC: 220 MG/DL
CREAT SERPL-MCNC: 0.82 MG/DL (ref 0.51–0.95)
EGFRCR SERPLBLD CKD-EPI 2021: >90 ML/MIN/1.73M2
ERYTHROCYTE [DISTWIDTH] IN BLOOD BY AUTOMATED COUNT: 11.8 % (ref 10–15)
EST. AVERAGE GLUCOSE BLD GHB EST-MCNC: 114 MG/DL
FASTING STATUS PATIENT QL REPORTED: NO
FASTING STATUS PATIENT QL REPORTED: NO
GLUCOSE SERPL-MCNC: 85 MG/DL (ref 70–99)
HBA1C MFR BLD: 5.6 % (ref 0–5.6)
HCO3 SERPL-SCNC: 24 MMOL/L (ref 22–29)
HCT VFR BLD AUTO: 39.5 % (ref 35–47)
HDLC SERPL-MCNC: 57 MG/DL
HGB BLD-MCNC: 13.8 G/DL (ref 11.7–15.7)
LDLC SERPL CALC-MCNC: 151 MG/DL
MCH RBC QN AUTO: 29.4 PG (ref 26.5–33)
MCHC RBC AUTO-ENTMCNC: 34.9 G/DL (ref 31.5–36.5)
MCV RBC AUTO: 84 FL (ref 78–100)
NONHDLC SERPL-MCNC: 163 MG/DL
PLATELET # BLD AUTO: 212 10E3/UL (ref 150–450)
POTASSIUM SERPL-SCNC: 4 MMOL/L (ref 3.4–5.3)
PROT SERPL-MCNC: 7.7 G/DL (ref 6.4–8.3)
RBC # BLD AUTO: 4.69 10E6/UL (ref 3.8–5.2)
SODIUM SERPL-SCNC: 138 MMOL/L (ref 135–145)
TRIGL SERPL-MCNC: 58 MG/DL
TSH SERPL DL<=0.005 MIU/L-ACNC: 1.01 UIU/ML (ref 0.3–4.2)
WBC # BLD AUTO: 4.1 10E3/UL (ref 4–11)

## 2024-10-01 PROCEDURE — 36415 COLL VENOUS BLD VENIPUNCTURE: CPT | Performed by: STUDENT IN AN ORGANIZED HEALTH CARE EDUCATION/TRAINING PROGRAM

## 2024-10-01 PROCEDURE — 90471 IMMUNIZATION ADMIN: CPT | Performed by: STUDENT IN AN ORGANIZED HEALTH CARE EDUCATION/TRAINING PROGRAM

## 2024-10-01 PROCEDURE — 80061 LIPID PANEL: CPT | Performed by: STUDENT IN AN ORGANIZED HEALTH CARE EDUCATION/TRAINING PROGRAM

## 2024-10-01 PROCEDURE — 80053 COMPREHEN METABOLIC PANEL: CPT | Performed by: STUDENT IN AN ORGANIZED HEALTH CARE EDUCATION/TRAINING PROGRAM

## 2024-10-01 PROCEDURE — 84443 ASSAY THYROID STIM HORMONE: CPT | Performed by: STUDENT IN AN ORGANIZED HEALTH CARE EDUCATION/TRAINING PROGRAM

## 2024-10-01 PROCEDURE — 85027 COMPLETE CBC AUTOMATED: CPT | Performed by: STUDENT IN AN ORGANIZED HEALTH CARE EDUCATION/TRAINING PROGRAM

## 2024-10-01 PROCEDURE — 99213 OFFICE O/P EST LOW 20 MIN: CPT | Mod: 25 | Performed by: STUDENT IN AN ORGANIZED HEALTH CARE EDUCATION/TRAINING PROGRAM

## 2024-10-01 PROCEDURE — 83036 HEMOGLOBIN GLYCOSYLATED A1C: CPT | Performed by: STUDENT IN AN ORGANIZED HEALTH CARE EDUCATION/TRAINING PROGRAM

## 2024-10-01 PROCEDURE — 90656 IIV3 VACC NO PRSV 0.5 ML IM: CPT | Performed by: STUDENT IN AN ORGANIZED HEALTH CARE EDUCATION/TRAINING PROGRAM

## 2024-10-01 PROCEDURE — 99395 PREV VISIT EST AGE 18-39: CPT | Mod: 25 | Performed by: STUDENT IN AN ORGANIZED HEALTH CARE EDUCATION/TRAINING PROGRAM

## 2024-10-01 SDOH — HEALTH STABILITY: PHYSICAL HEALTH: ON AVERAGE, HOW MANY DAYS PER WEEK DO YOU ENGAGE IN MODERATE TO STRENUOUS EXERCISE (LIKE A BRISK WALK)?: 1 DAY

## 2024-10-01 ASSESSMENT — PAIN SCALES - GENERAL: PAINLEVEL: NO PAIN (0)

## 2024-10-01 ASSESSMENT — SOCIAL DETERMINANTS OF HEALTH (SDOH): HOW OFTEN DO YOU GET TOGETHER WITH FRIENDS OR RELATIVES?: ONCE A WEEK

## 2024-10-01 NOTE — PROGRESS NOTES
Preventive Care Visit  [facility], Family Medicine  Oct 1, 2024      Assessment & Plan      Diagnosis Comments   1. Family history of uterine cancer  TSH with free T4 reflex, Lipid Profile (Chol, Trig, HDL, LDL calc), Comprehensive metabolic panel (BMP + Alb, Alk Phos, ALT, AST, Total. Bili, TP), Hemoglobin A1c, CBC with platelets       2. Routine general medical examination at a health care facility  US Pelvic Complete with Transvaginal       3. Thyroid fullness  US Thyroid       4. Spotting        5. IUD (intrauterine device) in place              Patient is a pleasant 38-year-old female with PMH of IUD in place, history of HR HPV who presents for annual.     Patient declined .  She is proficient in English.     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  Is better than it was in the past.  Vaccines: Due for flu shot.  Declines COVID.  Blood work: Ordered     Menses/menopause: Monthly still but has been having some spotting in between her periods.  This is somewhat new and has not been for 2 cycles.  See discussion below.  Birth control: Mirena placed 2018.  Remove in 2026.   Last Pap smear: See discussion below  .       Family history of uterine cancer:  Spotting  Patient reports that her mom had been in her 50s when she got diagnosed with uterine cancer.  Was asymptomatic.  Was an incidental finding.  Patient has never had screening herself.  Has some spotting that she is reporting.  This is new for her.  Could be due to IUD but would like to get TVUS to be sure given family history.  Patient is amenable.    Thyroid fullness:  Thyroid feels full on  the left side of the neck.  Will get thyroid ultrasound  Pe 16   12/7/17-negative colpo.  Per Dr Candelaria, repeat pap in 1 year  06/20/23 NIL Pap, Neg HR HPV.  Plan was to repeat cotesting in 3 years.      Patient has been advised of split billing requirements and indicates understanding: Yes        Counseling  Appropriate preventive services were addressed with this patient via screening, questionnaire, or discussion as appropriate for fall prevention, nutrition, physical activity, Tobacco-use cessation, social engagement, weight loss and cognition.  Checklist reviewing preventive services available has been given to the patient.  Reviewed patient's diet, addressing concerns and/or questions.   She is at risk for lack of exercise and has been provided with information to increase physical activity for the benefit of her well-being.   The patient was instructed to see the dentist every 6 months.   She is at risk for psychosocial distress and has been provided with information to reduce risk.     Sharyn Ahn is a 39 year old, presenting for the following:  Physical (Check cholesterol and general check up. )        10/1/2024    10:09 AM   Additional Questions   Roomed by Mercedes HILLIARD MA   Accompanied by Daughters        Health Care Directive  Patient does not have a Health Care Directive or Living Will: Discussed advance care planning with patient; information given to patient to review.    HPI        10/1/2024   General Health   How would you rate your overall physical health? Good   Feel stress (tense, anxious, or unable to sleep) Only a little      (!) STRESS CONCERN      10/1/2024   Nutrition   Three or more servings of calcium each day? Yes   Diet: Low fat/cholesterol    Gluten-free/reduced    I don't know   How many servings of fruit and vegetables per day? (!) 2-3   How many sweetened beverages each day? (!) 2       Multiple values from one day are sorted in reverse-chronological order         10/1/2024    Exercise   Days per week of moderate/strenous exercise 1 day      (!) EXERCISE CONCERN      10/1/2024   Social Factors   Frequency of gathering with friends or relatives Once a week   Worry food won't last until get money to buy more No   Food not last or not have enough money for food? No   Do you have housing? (Housing is defined as stable permanent housing and does not include staying ouside in a car, in a tent, in an abandoned building, in an overnight shelter, or couch-surfing.) Yes   Are you worried about losing your housing? No   Lack of transportation? No   Unable to get utilities (heat,electricity)? No            10/1/2024   Dental   Dentist two times every year? (!) NO          Today's PHQ-2 Score:       10/1/2024    10:05 AM   PHQ-2 ( 1999 [employer])   Q1: Little interest or pleasure in doing things 0   Q2: Feeling down, depressed or hopeless 0   PHQ-2 Score 0   Q1: Little interest or pleasure in doing things Not at all   Q2: Feeling down, depressed or hopeless Not at all   PHQ-2 Score 0         10/1/2024   Substance Use   Alcohol more than 3/day or more than 7/wk No   Do you use any other substances recreationally? No        Social History     Tobacco Use    Smoking status: Never    Smokeless tobacco: Never   Vaping Use    Vaping status: Never Used   Substance Use Topics    Alcohol use: No     Comment: Alcoholic Drinks/day: before pregnancy    Drug use: No        Mammogram Screening - Mammogram every 1-2 years updated in Health Maintenance based on mutual decision making        10/1/2024   STI Screening   New sexual partner(s) since last STI/HIV test? No        History of abnormal Pap smear: No - age 30- 64 PAP with HPV every 5 years recommended        Latest Ref Rng & Units 6/20/2023     2:01 PM 11/3/2017     3:40 PM   PAP / HPV   PAP  Negative for Intraepithelial Lesion or Malignancy (NILM)  Negative for squamous intraepithelial lesion or malignancy  Electronically signed by Fide Parker CT  (ASCP) on 2017 at 11:01 AM      HPV 16 DNA Negative Negative     HPV 18 DNA Negative Negative     Other HR HPV Negative Negative             10/1/2024   Contraception/Family Planning   Questions about contraception or family planning (!) YES-wanted to know when IUD should be removed           Reviewed and updated as needed this visit by Provider   Tobacco  Allergies  Meds  Problems  Med Hx  Surg Hx  Fam Hx            History reviewed. No pertinent past medical history.  History reviewed. No pertinent surgical history.  OB History    Para Term  AB Living   3 3 3 0 0 3   SAB IAB Ectopic Multiple Live Births   0 0 0 0 3      # Outcome Date GA Lbr Matthew/2nd Weight Sex Type Anes PTL Lv   3 Term 18 39w5d 03:00 / 00:01 3.49 kg (7 lb 11.1 oz) F Vag-Spont None N ANG      Name: MARU BROWN,FEMALE-CONNIE      Apgar1: 9  Apgar5: 9   2 Term 16 38w0d  3.2 kg (7 lb 0.9 oz) M Vag-Spont  N ANG      Birth Comments: [place]      Name: Moshebolivar Salazar Renévanesa Caicedonilda   1 Term 11 40w0d  3.1 kg (6 lb 13.4 oz) F Vag-Spont   ANG      Birth Comments: [place]; 40+weeks      Name: Ibis Lopes     Lab work is in process  Labs reviewed in EPIC  BP Readings from Last 3 Encounters:   10/01/24 124/80   23 110/70    Wt Readings from Last 3 Encounters:   10/01/24 76.8 kg (169 lb 6.4 oz)   23 72.8 kg (160 lb 6.4 oz)   19 64 kg (141 lb)                  Patient Active Problem List   Diagnosis    Cyst of left ovary    High risk human papilloma virus (HPV) infection of cervix    Heartburn    Insufficient social support     History reviewed. No pertinent surgical history.    Social History     Tobacco Use    Smoking status: Never    Smokeless tobacco: Never   Substance Use Topics    Alcohol use: No     Comment: Alcoholic Drinks/day: before pregnancy     Family History   Problem Relation Age of Onset    No Known Problems Mother     No Known Problems Father     No Known  "Problems Sister     No Known Problems Brother     No Known Problems Daughter     Anemia Son         blood transfusion at 2 mo of age.  2 other \"anemia crises\" associated with UTIs    Diabetes Maternal Aunt     Diabetes Maternal Grandmother          Current Outpatient Medications   Medication Sig Dispense Refill    levonorgestrel (MIRENA) 20 mcg/24 hours (5 yrs) 52 mg IUD [LEVONORGESTREL (MIRENA) 20 MCG/24 HOURS (5 YRS) 52 MG IUD] 1 each by Intrauterine route once.       No Known Allergies  Recent Labs   Lab Test 06/20/23  1438   A1C 5.3   *   HDL 71   TRIG 63   CR 0.77   GFRESTIMATED >90   POTASSIUM 3.9   TSH 0.84          Review of Systems  Constitutional, neuro, ENT, endocrine, pulmonary, cardiac, gastrointestinal, genitourinary, musculoskeletal, integument and psychiatric systems are negative, except as otherwise noted.     Objective    Exam  /80 (BP Location: Left arm, Patient Position: Sitting, Cuff Size: Adult Regular)   Pulse 60   Temp 98.4  F (36.9  C) (Oral)   Resp 16   Ht 1.67 m (5' 5.75\")   Wt 76.8 kg (169 lb 6.4 oz)   LMP 09/26/2024 (Exact Date)   SpO2 100%   BMI 27.55 kg/m     Estimated body mass index is 27.55 kg/m  as calculated from the following:    Height as of this encounter: 1.67 m (5' 5.75\").    Weight as of this encounter: 76.8 kg (169 lb 6.4 oz).    Physical Exam  GENERAL: alert and no distress  HENT: (+) left thyroid fullness, no obvious nodule noted.  No difficulty swallowing.  Normal external ears, ear canal and TM bilaterally   No oral lesions or macroglossia  NECK: no adenopathy, no asymmetry, masses, or scars  RESP: lungs clear to auscultation - no rales, rhonchi or wheezes  CV: regular rate and rhythm,  no murmur, click or rub, no peripheral edema  ABDOMEN: soft, nontender, no hepatosplenomegaly, no masses and bowel sounds normal  MS: no gross musculoskeletal defects noted, no edema  PSYCH: mentation appears normal, affect anxious but overall appropriate "         Signed Electronically by: Vikki Parra DO

## 2024-10-01 NOTE — PATIENT INSTRUCTIONS
Patient Education   Preventive Care Advice   This is general advice given by our system to help you stay healthy. However, your care team may have specific advice just for you. Please talk to your care team about your preventive care needs.  Nutrition  Eat 5 or more servings of fruits and vegetables each day.  Try wheat bread, brown rice and whole grain pasta (instead of white bread, rice, and pasta).  Get enough calcium and vitamin D. Check the label on foods and aim for 100% of the RDA (recommended daily allowance).  Lifestyle  Exercise at least 150 minutes each week  (30 minutes a day, 5 days a week).  Do muscle strengthening activities 2 days a week. These help control your weight and prevent disease.  No smoking.  Wear sunscreen to prevent skin cancer.  Have a dental exam and cleaning every 6 months.  Yearly exams  See your health care team every year to talk about:  Any changes in your health.  Any medicines your care team has prescribed.  Preventive care, family planning, and ways to prevent chronic diseases.  Shots (vaccines)   HPV shots (up to age 26), if you've never had them before.  Hepatitis B shots (up to age 59), if you've never had them before.  COVID-19 shot: Get this shot when it's due.  Flu shot: Get a flu shot every year.  Tetanus shot: Get a tetanus shot every 10 years.  Pneumococcal, hepatitis A, and RSV shots: Ask your care team if you need these based on your risk.  Shingles shot (for age 50 and up)  General health tests  Diabetes screening:  Starting at age 35, Get screened for diabetes at least every 3 years.  If you are younger than age 35, ask your care team if you should be screened for diabetes.  Cholesterol test: At age 39, start having a cholesterol test every 5 years, or more often if advised.  Bone density scan (DEXA): At age 50, ask your care team if you should have this scan for osteoporosis (brittle bones).  Hepatitis C: Get tested at least once in your life.  STIs (sexually  transmitted infections)  Before age 24: Ask your care team if you should be screened for STIs.  After age 24: Get screened for STIs if you're at risk. You are at risk for STIs (including HIV) if:  You are sexually active with more than one person.  You don't use condoms every time.  You or a partner was diagnosed with a sexually transmitted infection.  If you are at risk for HIV, ask about PrEP medicine to prevent HIV.  Get tested for HIV at least once in your life, whether you are at risk for HIV or not.  Cancer screening tests  Cervical cancer screening: If you have a cervix, begin getting regular cervical cancer screening tests starting at age 21.  Breast cancer scan (mammogram): If you've ever had breasts, begin having regular mammograms starting at age 40. This is a scan to check for breast cancer.  Colon cancer screening: It is important to start screening for colon cancer at age 45.  Have a colonoscopy test every 10 years (or more often if you're at risk) Or, ask your provider about stool tests like a FIT test every year or Cologuard test every 3 years.  To learn more about your testing options, visit:   .  For help making a decision, visit:   https://bit.ly/vc53333.  Prostate cancer screening test: If you have a prostate, ask your care team if a prostate cancer screening test (PSA) at age 55 is right for you.  Lung cancer screening: If you are a current or former smoker ages 50 to 80, ask your care team if ongoing lung cancer screenings are right for you.  For informational purposes only. Not to replace the advice of your health care provider. Copyright   2023 [employer]. All rights reserved. Clinically reviewed by the [facility]. [employer] [id number] - REV 01/24.

## 2024-10-09 ENCOUNTER — HOSPITAL ENCOUNTER (OUTPATIENT)
Dept: ULTRASOUND IMAGING | Facility: HOSPITAL | Age: 39
Discharge: HOME | End: 2024-10-09
Attending: STUDENT IN AN ORGANIZED HEALTH CARE EDUCATION/TRAINING PROGRAM | Admitting: STUDENT IN AN ORGANIZED HEALTH CARE EDUCATION/TRAINING PROGRAM
Payer: COMMERCIAL

## 2024-10-09 DIAGNOSIS — Z00.00 ROUTINE GENERAL MEDICAL EXAMINATION AT A HEALTH CARE FACILITY: ICD-10-CM

## 2024-10-09 DIAGNOSIS — E07.89 THYROID FULLNESS: ICD-10-CM

## 2024-10-09 PROCEDURE — 76536 US EXAM OF HEAD AND NECK: CPT

## 2024-10-09 PROCEDURE — 76856 US EXAM PELVIC COMPLETE: CPT

## 2024-10-10 DIAGNOSIS — T83.32XA MALPOSITIONED INTRAUTERINE DEVICE (IUD), INITIAL ENCOUNTER: Primary | ICD-10-CM

## 2024-12-10 ENCOUNTER — OFFICE VISIT (OUTPATIENT)
Dept: OBGYN | Facility: CLINIC | Age: 39
End: 2024-12-10
Attending: STUDENT IN AN ORGANIZED HEALTH CARE EDUCATION/TRAINING PROGRAM
Payer: COMMERCIAL

## 2024-12-10 ENCOUNTER — PREP FOR PROCEDURE (OUTPATIENT)
Dept: OBGYN | Facility: CLINIC | Age: 39
End: 2024-12-10

## 2024-12-10 VITALS
BODY MASS INDEX: 27.48 KG/M2 | SYSTOLIC BLOOD PRESSURE: 128 MMHG | DIASTOLIC BLOOD PRESSURE: 74 MMHG | WEIGHT: 171 LBS | HEIGHT: 66 IN | OXYGEN SATURATION: 98 % | HEART RATE: 87 BPM

## 2024-12-10 DIAGNOSIS — T83.32XA MALPOSITIONED INTRAUTERINE DEVICE (IUD), INITIAL ENCOUNTER: Primary | ICD-10-CM

## 2024-12-10 DIAGNOSIS — T83.32XA MALPOSITIONED INTRAUTERINE DEVICE (IUD), INITIAL ENCOUNTER: ICD-10-CM

## 2024-12-10 PROCEDURE — 99203 OFFICE O/P NEW LOW 30 MIN: CPT | Performed by: OBSTETRICS & GYNECOLOGY

## 2024-12-10 ASSESSMENT — ANXIETY QUESTIONNAIRES
4. TROUBLE RELAXING: NOT AT ALL
7. FEELING AFRAID AS IF SOMETHING AWFUL MIGHT HAPPEN: NOT AT ALL
GAD7 TOTAL SCORE: 0
IF YOU CHECKED OFF ANY PROBLEMS ON THIS QUESTIONNAIRE, HOW DIFFICULT HAVE THESE PROBLEMS MADE IT FOR YOU TO DO YOUR WORK, TAKE CARE OF THINGS AT HOME, OR GET ALONG WITH OTHER PEOPLE: NOT DIFFICULT AT ALL
GAD7 TOTAL SCORE: 0
7. FEELING AFRAID AS IF SOMETHING AWFUL MIGHT HAPPEN: NOT AT ALL
8. IF YOU CHECKED OFF ANY PROBLEMS, HOW DIFFICULT HAVE THESE MADE IT FOR YOU TO DO YOUR WORK, TAKE CARE OF THINGS AT HOME, OR GET ALONG WITH OTHER PEOPLE?: NOT DIFFICULT AT ALL
2. NOT BEING ABLE TO STOP OR CONTROL WORRYING: NOT AT ALL
3. WORRYING TOO MUCH ABOUT DIFFERENT THINGS: NOT AT ALL
6. BECOMING EASILY ANNOYED OR IRRITABLE: NOT AT ALL
GAD7 TOTAL SCORE: 0
5. BEING SO RESTLESS THAT IT IS HARD TO SIT STILL: NOT AT ALL
1. FEELING NERVOUS, ANXIOUS, OR ON EDGE: NOT AT ALL

## 2024-12-10 NOTE — PROGRESS NOTES
"CC: Anabelle Herring is here secondary to a malpositioned IUD.    HPI: The pt is a 39 year old MBF  who presents with an ultrasound exam with a malpositioned IUD.  The Mirena was originally placed in 2018.  She was seen in Oct for her annual exam.  She has always had periods with the Mirena in place, but in the month prior to her visit, she had 2 periods.  Because her mother was diagnosed with endometrial cancer and the irregular period, ultrasound was done on Oct 9, 2024. It showed a 3 mm endometrium with the IUD in the left/posterior myometrium on the left side.  The right ovary was normal.  The left ovary had a 2.2 cm simple cyst.  She is not having any pain.    No past medical history on file.    No past surgical history on file.    Patient's   Family History   Problem Relation Age of Onset    No Known Problems Mother     No Known Problems Father     Diabetes Maternal Grandmother     No Known Problems Brother     No Known Problems Sister     Anemia Son         blood transfusion at 2 mo of age.  2 other \"anemia crises\" associated with UTIs    No Known Problems Daughter     Diabetes Maternal Aunt        Patient   Social History     Socioeconomic History    Marital status:     Number of children: 2   Tobacco Use    Smoking status: Never    Smokeless tobacco: Never   Vaping Use    Vaping status: Never Used   Substance and Sexual Activity    Alcohol use: No     Comment: Alcoholic Drinks/day: before pregnancy    Drug use: No    Sexual activity: Yes     Partners: Male     Birth control/protection: None     Social Drivers of Health     Financial Resource Strain: Low Risk  (10/1/2024)    Financial Resource Strain     Within the past 12 months, have you or your family members you live with been unable to get utilities (heat, electricity) when it was really needed?: No   Food Insecurity: Low Risk  (10/1/2024)    Food Insecurity     Within the past 12 months, did you worry that your food would run " "out before you got money to buy more?: No     Within the past 12 months, did the food you bought just not last and you didn t have money to get more?: No   Transportation Needs: Low Risk  (10/1/2024)    Transportation Needs     Within the past 12 months, has lack of transportation kept you from medical appointments, getting your medicines, non-medical meetings or appointments, work, or from getting things that you need?: No   Physical Activity: Unknown (10/1/2024)    Exercise Vital Sign     Days of Exercise per Week: 1 day   Stress: No Stress Concern Present (10/1/2024)    [employer] [place] [employer] - Occupational Stress Questionnaire     Feeling of Stress : Only a little   Social Connections: Unknown (10/1/2024)    Social Connection and Isolation Panel [NHANES]     Frequency of Social Gatherings with Friends and Family: Once a week   Housing Stability: Low Risk  (10/1/2024)    Housing Stability     Do you have housing? : Yes     Are you worried about losing your housing?: No       Outpatient Medications Prior to Visit   Medication Sig Dispense Refill    levonorgestrel (MIRENA) 20 mcg/24 hours (5 yrs) 52 mg IUD [LEVONORGESTREL (MIRENA) 20 MCG/24 HOURS (5 YRS) 52 MG IUD] 1 each by Intrauterine route once.       No facility-administered medications prior to visit.       Patient has No Known Allergies.    ROS:  12 part ROS is negative aside from those symptoms in the HPI    PE:  /74 (BP Location: Right arm, Patient Position: Sitting, Cuff Size: Adult Regular)   Pulse 87   Ht 1.67 m (5' 5.75\")   Wt 77.6 kg (171 lb)   LMP 12/01/2024           Body mass index is 27.81 kg/m .    General: overweight BF, NAD  Psych: normal mood  Neuro: CN I-XII grossly intact  MS: normal gait    Assessment: 39 year old MBF  with a malpositioned Mirena IUD    Plan: Natural history of causes for malpositioning discussed with the patient.  We discussed that with the arm being in the myometrium, we should schedule " [place] [facility]  After discussing further, she is in agreement.  She was counseled on the need for someone to drive her home and stay with her after surgery, the need for a pre-op exam prior to the surgery, the need to have nothing to eat for at least 8 hours prior to surgery, the need to not have anything to drink within 2 hours of surgery, and that the surgery center would call her a day or two before the procedure to go over details of the process and what time to arrive.  We did discuss that she could have only clear liquids up to 2 hours before surgery.  Questions were answered to the best of my ability.    43 minutes spent on the date of the encounter doing chart review, review of test results, interpretation of tests, patient visit, and documentation   Answers submitted by the patient for this visit:  Patient Health Questionnaire (G7) (Submitted on 12/10/2024)  DAVID 7 TOTAL SCORE: 0

## 2025-01-13 ENCOUNTER — OFFICE VISIT (OUTPATIENT)
Dept: FAMILY MEDICINE | Facility: CLINIC | Age: 40
End: 2025-01-13
Payer: COMMERCIAL

## 2025-01-13 VITALS
HEIGHT: 66 IN | WEIGHT: 169.3 LBS | HEART RATE: 72 BPM | TEMPERATURE: 97.8 F | BODY MASS INDEX: 27.21 KG/M2 | OXYGEN SATURATION: 98 % | RESPIRATION RATE: 16 BRPM | DIASTOLIC BLOOD PRESSURE: 70 MMHG | SYSTOLIC BLOOD PRESSURE: 126 MMHG

## 2025-01-13 DIAGNOSIS — T83.32XD MALPOSITIONED INTRAUTERINE DEVICE (IUD), SUBSEQUENT ENCOUNTER: ICD-10-CM

## 2025-01-13 DIAGNOSIS — Z01.818 PREOP GENERAL PHYSICAL EXAM: Primary | ICD-10-CM

## 2025-01-13 LAB
ANION GAP SERPL CALCULATED.3IONS-SCNC: 9 MMOL/L (ref 7–15)
BUN SERPL-MCNC: 13.1 MG/DL (ref 6–20)
CALCIUM SERPL-MCNC: 9.1 MG/DL (ref 8.8–10.4)
CHLORIDE SERPL-SCNC: 105 MMOL/L (ref 98–107)
CREAT SERPL-MCNC: 0.86 MG/DL (ref 0.51–0.95)
EGFRCR SERPLBLD CKD-EPI 2021: 88 ML/MIN/1.73M2
ERYTHROCYTE [DISTWIDTH] IN BLOOD BY AUTOMATED COUNT: 12.1 % (ref 10–15)
GLUCOSE SERPL-MCNC: 85 MG/DL (ref 70–99)
HCG UR QL: NEGATIVE
HCO3 SERPL-SCNC: 25 MMOL/L (ref 22–29)
HCT VFR BLD AUTO: 40.1 % (ref 35–47)
HGB BLD-MCNC: 13.5 G/DL (ref 11.7–15.7)
MCH RBC QN AUTO: 28.8 PG (ref 26.5–33)
MCHC RBC AUTO-ENTMCNC: 33.7 G/DL (ref 31.5–36.5)
MCV RBC AUTO: 86 FL (ref 78–100)
PLATELET # BLD AUTO: 166 10E3/UL (ref 150–450)
POTASSIUM SERPL-SCNC: 4.1 MMOL/L (ref 3.4–5.3)
RBC # BLD AUTO: 4.69 10E6/UL (ref 3.8–5.2)
SODIUM SERPL-SCNC: 139 MMOL/L (ref 135–145)
WBC # BLD AUTO: 4.1 10E3/UL (ref 4–11)

## 2025-01-13 PROCEDURE — 85027 COMPLETE CBC AUTOMATED: CPT | Performed by: STUDENT IN AN ORGANIZED HEALTH CARE EDUCATION/TRAINING PROGRAM

## 2025-01-13 PROCEDURE — 81025 URINE PREGNANCY TEST: CPT | Performed by: STUDENT IN AN ORGANIZED HEALTH CARE EDUCATION/TRAINING PROGRAM

## 2025-01-13 PROCEDURE — 80048 BASIC METABOLIC PNL TOTAL CA: CPT | Performed by: STUDENT IN AN ORGANIZED HEALTH CARE EDUCATION/TRAINING PROGRAM

## 2025-01-13 PROCEDURE — 99213 OFFICE O/P EST LOW 20 MIN: CPT | Performed by: STUDENT IN AN ORGANIZED HEALTH CARE EDUCATION/TRAINING PROGRAM

## 2025-01-13 PROCEDURE — 36415 COLL VENOUS BLD VENIPUNCTURE: CPT | Performed by: STUDENT IN AN ORGANIZED HEALTH CARE EDUCATION/TRAINING PROGRAM

## 2025-01-13 NOTE — PROGRESS NOTES
Preoperative Evaluation  [address]  [facility] [address]  Phone: [phone]  Fax: [phone]  Primary Provider: Vikki Parra DO  Pre-op Performing Provider: Vikki Parra DO  Jan 13, 2025   {Provider  Link to PREOP SmartSet  REQUIRED to apply standard patient instructions and medication directions to the AVS :550673}  {ROOMER review and update patient entered surgical information if needed :990349}        1/13/2025   Surgical Information   What procedure is being done? hyteroscopy   Facility or Hospital where procedure/surgery will be performed: [facility]   Who is doing the procedure / surgery? Dr Mary Hunt   Date of surgery / procedure: 22   Time of surgery / procedure: unknown   Where do you plan to recover after surgery? at home with family     Fax number for surgical facility: Note does not need to be faxed, will be available electronically in Epic.    {Provider Charting Preference for Preop :049540}    Sharyn Ahn is a 39 year old, presenting for the following:  Pre-Op Exam (Hysteroscopy )          1/13/2025     9:55 AM   Additional Questions   Roomed by Christi JIMENEZ MA     HPI related to upcoming procedure: ***        1/13/2025   Pre-Op Questionnaire   Have you ever had a heart attack or stroke? No   Have you ever had surgery on your heart or blood vessels, such as a stent placement, a coronary artery bypass, or surgery on an artery in your head, neck, heart, or legs? No   Do you have chest pain with activity? No   Do you have a history of heart failure? No   Do you currently have a cold, bronchitis or symptoms of other infection? No   Do you have a cough, shortness of breath, or wheezing? No   Do you or anyone in your family have previous history of blood clots? No   Do you or does anyone in your family have a serious bleeding problem such as prolonged bleeding following surgeries or cuts? (!) UNKNOWN ***    Have you ever had problems with anemia or been told to take iron pills? (!) YES ***   Have you had any abnormal blood loss such as black, tarry or bloody stools, or abnormal vaginal bleeding? No   Have you ever had a blood transfusion? No   Are you willing to have a blood transfusion if it is medically needed before, during, or after your surgery? Yes   Have you or any of your relatives ever had problems with anesthesia? (!) UNKNOWN ***   Do you have sleep apnea, excessive snoring or daytime drowsiness? No   Do you have any artifical heart valves or other implanted medical devices like a pacemaker, defibrillator, or continuous glucose monitor? No   Do you have artificial joints? No   Are you allergic to latex? No     Health Care Directive  Patient does not have a Health Care Directive: {ADVANCE_DIRECTIVE_STATUS:269512}    Preoperative Review of   {Mnpmpreport:605089}  {Review MNPMP for all patients per [facility] MNPMP Profile:248904}    {Chronic problem details (Optional) :044296}    Patient Active Problem List    Diagnosis Date Noted    Malpositioned intrauterine device (IUD), initial encounter 12/10/2024     Priority: Medium    Family history of uterine cancer 10/01/2024     Priority: Medium    Insufficient social support 2018     Priority: Medium    Heartburn 2018     Priority: Medium    High risk human papilloma virus (HPV) infection of cervix 11/10/2017     Priority: Medium     17 NIL Pap, +HR HPV type 16   17-negative colpo.  Per Dr Candelaria, repeat pap in 1 year  23 NIL Pap, Neg HR HPV Plan provider review         Cyst of left ovary 2017     Priority: Medium     --17: Ultrasound reveals incidental findin.2 cm left paraovarian   anechoic cyst  Consider postpartum follow-up ultrasound.          No past medical history on file.  No past surgical history on file.  Current Outpatient Medications   Medication Sig Dispense Refill    levonorgestrel (MIRENA) 20 mcg/24 hours (5  "yrs) 52 mg IUD [LEVONORGESTREL (MIRENA) 20 MCG/24 HOURS (5 YRS) 52 MG IUD] 1 each by Intrauterine route once.         No Known Allergies     Social History     Tobacco Use    Smoking status: Never     Passive exposure: Never    Smokeless tobacco: Never   Substance Use Topics    Alcohol use: No     Comment: Alcoholic Drinks/day: before pregnancy     {FAMILY HISTORY (Optional):044667124}  History   Drug Use No           {ROS Picklists (Optional):710117}    Objective    /70   Pulse 72   Temp 97.8  F (36.6  C) (Oral)   Resp 16   Ht 1.68 m (5' 6.14\")   Wt 76.8 kg (169 lb 4.8 oz)   LMP 2024 (Approximate)   SpO2 98%   BMI 27.21 kg/m     Estimated body mass index is 27.21 kg/m  as calculated from the following:    Height as of this encounter: 1.68 m (5' 6.14\").    Weight as of this encounter: 76.8 kg (169 lb 4.8 oz).  Physical Exam  {Exam List :971596}    Recent Labs   Lab Test 10/01/24  1255   HGB 13.8         POTASSIUM 4.0   CR 0.82   A1C 5.6        Diagnostics  {LABS:086033}   {EK}    Revised Cardiac Risk Index (RCRI)  The patient has the following serious cardiovascular risks for perioperative complications:  {PREOP REVISED CARDIAC RISK INDEX (RCRI) :401237}     RCRI Interpretation: {REVISED CARDIAC RISK INTERPRETATION :621685}         Signed Electronically by: Vikki Parra DO  A copy of this evaluation report is provided to the requesting physician.    {Provider Resources  Preop [facility] Preop Guidelines  Revised Cardiac Risk Index :758553}   {Email feedback regarding this note to [email or web address]   :706183}  "

## 2025-01-13 NOTE — H&P (VIEW-ONLY)
Preoperative Evaluation  [place] [id number] [facility] [address]  [facility] [address]  Phone: [phone]  Fax: [phone]  Primary Provider: Vikki Parra DO  Pre-op Performing Provider: Vikki Parra DO  Jan 13, 2025 1/13/2025   Surgical Information   What procedure is being done? hyteroscopy   Facility or Hospital where procedure/surgery will be performed: [facility]   Who is doing the procedure / surgery? Dr Mary Hunt   Date of surgery / procedure: 22   Time of surgery / procedure: unknown   Where do you plan to recover after surgery? at home with family     Fax number for surgical facility: Note does not need to be faxed, will be available electronically in Epic.    Assessment & Plan     The proposed surgical procedure is considered INTERMEDIATE risk.     Diagnosis Comments   1. Preop general physical exam  CBC with platelets, Basic metabolic panel  (Ca, Cl, CO2, Creat, Gluc, K, Na, BUN), HCG qualitative urine       2. Malpositioned intrauterine device (IUD), subsequent encounter            RCRI 0  No EKG indicated  Will get CBC, BMP and urinary pregnancy test  Is not on any chronic medications.  Avoid all NSAIDs 7 days ahead of time.  As long as blood work looks good, she is all set for surgery          - No identified additional risk factors other than previously addressed    Recommendation  Approval given to proceed with proposed procedure pending review of diagnostic evaluation.    Sharyn Ahn is a 39 year old, presenting for the following:  Pre-Op Exam (Hysteroscopy )    Last time I saw patient, she was having some spotting and TVUS was pursued.    TVUS showed that her IUD was malpositioned and extending into the myometrium on the last.  Sent to OB/GYN.  They are planning on hysteroscopy with removal of the IUD 1/22/2025    Patient is not having any significant abdominal pain, no significant spotting and no  fever/chills.    Has no history of blood clots, DVT, bleeding disorder.          1/13/2025     9:55 AM   Additional Questions   Roomed by Christi JIMENEZ MA           1/13/2025   Pre-Op Questionnaire   Have you ever had a heart attack or stroke? No   Have you ever had surgery on your heart or blood vessels, such as a stent placement, a coronary artery bypass, or surgery on an artery in your head, neck, heart, or legs? No   Do you have chest pain with activity? No   Do you have a history of heart failure? No   Do you currently have a cold, bronchitis or symptoms of other infection? No   Do you have a cough, shortness of breath, or wheezing? No   Do you or anyone in your family have previous history of blood clots? No   Do you or does anyone in your family have a serious bleeding problem such as prolonged bleeding following surgeries or cuts? No    Have you ever had problems with anemia or been told to take iron pills? (!) YES  - KIANA during pregnancy    Have you had any abnormal blood loss such as black, tarry or bloody stools, or abnormal vaginal bleeding? No   Have you ever had a blood transfusion? No   Are you willing to have a blood transfusion if it is medically needed before, during, or after your surgery? Yes   Have you or any of your relatives ever had problems with anesthesia? No    Do you have sleep apnea, excessive snoring or daytime drowsiness? No   Do you have any artifical heart valves or other implanted medical devices like a pacemaker, defibrillator, or continuous glucose monitor? No   Do you have artificial joints? No   Are you allergic to latex? No     Health Care Directive  Patient does not have a Health Care Directive: Discussed advance care planning with patient; information given to patient to review.    Preoperative Review of    reviewed - no record of controlled substances prescribed.      Status of Chronic Conditions:  Stable       Patient Active Problem List    Diagnosis Date Noted     "Malpositioned intrauterine device (IUD), initial encounter 12/10/2024     Priority: Medium    Family history of uterine cancer 10/01/2024     Priority: Medium    Insufficient social support 2018     Priority: Medium    Heartburn 2018     Priority: Medium    High risk human papilloma virus (HPV) infection of cervix 11/10/2017     Priority: Medium     17 NIL Pap, +HR HPV type 16   17-negative colpo.  Per Dr Candelaria, repeat pap in 1 year  23 NIL Pap, Neg HR HPV Plan provider review         Cyst of left ovary 2017     Priority: Medium     17: Ultrasound reveals incidental findin.2 cm left paraovarian   anechoic cyst  Consider postpartum follow-up ultrasound.          No past medical history on file.  No past surgical history on file.  Current Outpatient Medications   Medication Sig Dispense Refill    levonorgestrel (MIRENA) 20 mcg/24 hours (5 yrs) 52 mg IUD [LEVONORGESTREL (MIRENA) 20 MCG/24 HOURS (5 YRS) 52 MG IUD] 1 each by Intrauterine route once.         No Known Allergies     Social History     Tobacco Use    Smoking status: Never     Passive exposure: Never    Smokeless tobacco: Never   Substance Use Topics    Alcohol use: No     Comment: Alcoholic Drinks/day: before pregnancy     Family History   Problem Relation Age of Onset    No Known Problems Mother     No Known Problems Father     Diabetes Maternal Grandmother     No Known Problems Brother     No Known Problems Sister     Anemia Son         blood transfusion at 2 mo of age.  2 other \"anemia crises\" associated with UTIs    No Known Problems Daughter     Diabetes Maternal Aunt      History   Drug Use No             Review of Systems  CONSTITUTIONAL: NEGATIVE for fever, chills, change in weight  INTEGUMENTARY/SKIN: NEGATIVE for worrisome rashes, moles or lesions  EYES: NEGATIVE for vision changes or irritation  ENT/MOUTH: NEGATIVE for ear, mouth and throat problems  RESP: NEGATIVE for significant cough or SOB  BREAST: " "NEGATIVE for masses, tenderness or discharge  CV: NEGATIVE for chest pain, palpitations or peripheral edema  GI: NEGATIVE for nausea, abdominal pain, heartburn, or change in bowel habits  : NEGATIVE for frequency, dysuria, or hematuria  MUSCULOSKELETAL: NEGATIVE for significant arthralgias or myalgia  NEURO: NEGATIVE for weakness, dizziness or paresthesias  ENDOCRINE: NEGATIVE for temperature intolerance, skin/hair changes  HEME: NEGATIVE for bleeding problems  PSYCHIATRIC: NEGATIVE for changes in mood or affect    Objective    /70   Pulse 72   Temp 97.8  F (36.6  C) (Oral)   Resp 16   Ht 1.68 m (5' 6.14\")   Wt 76.8 kg (169 lb 4.8 oz)   LMP 12/26/2024 (Approximate)   SpO2 98%   BMI 27.21 kg/m     Estimated body mass index is 27.21 kg/m  as calculated from the following:    Height as of this encounter: 1.68 m (5' 6.14\").    Weight as of this encounter: 76.8 kg (169 lb 4.8 oz).  Physical Exam  GENERAL: alert and no distress  EYES: Eyes grossly normal to inspection, PERRL and conjunctivae and sclerae normal  HENT: ear canals and TM's normal, nose and mouth without ulcers or lesions  NECK: no adenopathy, no asymmetry, masses, or scars  RESP: lungs clear to auscultation - no rales, rhonchi or wheezes  CV: regular rate and rhythm, normal S1 S2, no S3 or S4, no murmur, click or rub, no peripheral edema  ABDOMEN: soft, nontender, no hepatosplenomegaly, no masses and bowel sounds normal  MS: no gross musculoskeletal defects noted, no edema  PSYCH: mentation appears normal, affect normal/bright    Recent Labs   Lab Test 10/01/24  1255   HGB 13.8         POTASSIUM 4.0   CR 0.82   A1C 5.6        Diagnostics  Labs pending at this time.  Results will be reviewed when available.   No EKG required, no history of coronary heart disease, significant arrhythmia, peripheral arterial disease or other structural heart disease.    Revised Cardiac Risk Index (RCRI)  The patient has the following serious " cardiovascular risks for perioperative complications:   - No serious cardiac risks = 0 points     RCRI Interpretation: 0 points: Class I (very low risk - 0.4% complication rate)         Signed Electronically by: Vikki Parra DO  A copy of this evaluation report is provided to the requesting physician.

## 2025-01-13 NOTE — PROGRESS NOTES
Preoperative Evaluation  [place] [id number] [facility] [address]  [facility] [address]  Phone: [phone]  Fax: [phone]  Primary Provider: Vikki Parra DO  Pre-op Performing Provider: Vikki Parra DO  Jan 13, 2025 1/13/2025   Surgical Information   What procedure is being done? hyteroscopy   Facility or Hospital where procedure/surgery will be performed: [facility]   Who is doing the procedure / surgery? Dr Mary Hunt   Date of surgery / procedure: 22   Time of surgery / procedure: unknown   Where do you plan to recover after surgery? at home with family     Fax number for surgical facility: Note does not need to be faxed, will be available electronically in Epic.    Assessment & Plan     The proposed surgical procedure is considered INTERMEDIATE risk.     Diagnosis Comments   1. Preop general physical exam  CBC with platelets, Basic metabolic panel  (Ca, Cl, CO2, Creat, Gluc, K, Na, BUN), HCG qualitative urine       2. Malpositioned intrauterine device (IUD), subsequent encounter            RCRI 0  No EKG indicated  Will get CBC, BMP and urinary pregnancy test  Is not on any chronic medications.  Avoid all NSAIDs 7 days ahead of time.  As long as blood work looks good, she is all set for surgery          - No identified additional risk factors other than previously addressed    Recommendation  Approval given to proceed with proposed procedure pending review of diagnostic evaluation.    Sharyn Ahn is a 39 year old, presenting for the following:  Pre-Op Exam (Hysteroscopy )    Last time I saw patient, she was having some spotting and TVUS was pursued.    TVUS showed that her IUD was malpositioned and extending into the myometrium on the last.  Sent to OB/GYN.  They are planning on hysteroscopy with removal of the IUD 1/22/2025    Patient is not having any significant abdominal pain, no significant spotting and no  fever/chills.    Has no history of blood clots, DVT, bleeding disorder.          1/13/2025     9:55 AM   Additional Questions   Roomed by Christi JIMENEZ MA           1/13/2025   Pre-Op Questionnaire   Have you ever had a heart attack or stroke? No   Have you ever had surgery on your heart or blood vessels, such as a stent placement, a coronary artery bypass, or surgery on an artery in your head, neck, heart, or legs? No   Do you have chest pain with activity? No   Do you have a history of heart failure? No   Do you currently have a cold, bronchitis or symptoms of other infection? No   Do you have a cough, shortness of breath, or wheezing? No   Do you or anyone in your family have previous history of blood clots? No   Do you or does anyone in your family have a serious bleeding problem such as prolonged bleeding following surgeries or cuts? No    Have you ever had problems with anemia or been told to take iron pills? (!) YES  - KIANA during pregnancy    Have you had any abnormal blood loss such as black, tarry or bloody stools, or abnormal vaginal bleeding? No   Have you ever had a blood transfusion? No   Are you willing to have a blood transfusion if it is medically needed before, during, or after your surgery? Yes   Have you or any of your relatives ever had problems with anesthesia? No    Do you have sleep apnea, excessive snoring or daytime drowsiness? No   Do you have any artifical heart valves or other implanted medical devices like a pacemaker, defibrillator, or continuous glucose monitor? No   Do you have artificial joints? No   Are you allergic to latex? No     Health Care Directive  Patient does not have a Health Care Directive: Discussed advance care planning with patient; information given to patient to review.    Preoperative Review of    reviewed - no record of controlled substances prescribed.      Status of Chronic Conditions:  Stable       Patient Active Problem List    Diagnosis Date Noted     "Malpositioned intrauterine device (IUD), initial encounter 12/10/2024     Priority: Medium    Family history of uterine cancer 10/01/2024     Priority: Medium    Insufficient social support 2018     Priority: Medium    Heartburn 2018     Priority: Medium    High risk human papilloma virus (HPV) infection of cervix 11/10/2017     Priority: Medium     17 NIL Pap, +HR HPV type 16   17-negative colpo.  Per Dr Candelaria, repeat pap in 1 year  23 NIL Pap, Neg HR HPV Plan provider review         Cyst of left ovary 2017     Priority: Medium     17: Ultrasound reveals incidental findin.2 cm left paraovarian   anechoic cyst  Consider postpartum follow-up ultrasound.          No past medical history on file.  No past surgical history on file.  Current Outpatient Medications   Medication Sig Dispense Refill    levonorgestrel (MIRENA) 20 mcg/24 hours (5 yrs) 52 mg IUD [LEVONORGESTREL (MIRENA) 20 MCG/24 HOURS (5 YRS) 52 MG IUD] 1 each by Intrauterine route once.         No Known Allergies     Social History     Tobacco Use    Smoking status: Never     Passive exposure: Never    Smokeless tobacco: Never   Substance Use Topics    Alcohol use: No     Comment: Alcoholic Drinks/day: before pregnancy     Family History   Problem Relation Age of Onset    No Known Problems Mother     No Known Problems Father     Diabetes Maternal Grandmother     No Known Problems Brother     No Known Problems Sister     Anemia Son         blood transfusion at 2 mo of age.  2 other \"anemia crises\" associated with UTIs    No Known Problems Daughter     Diabetes Maternal Aunt      History   Drug Use No             Review of Systems  CONSTITUTIONAL: NEGATIVE for fever, chills, change in weight  INTEGUMENTARY/SKIN: NEGATIVE for worrisome rashes, moles or lesions  EYES: NEGATIVE for vision changes or irritation  ENT/MOUTH: NEGATIVE for ear, mouth and throat problems  RESP: NEGATIVE for significant cough or SOB  BREAST: " "NEGATIVE for masses, tenderness or discharge  CV: NEGATIVE for chest pain, palpitations or peripheral edema  GI: NEGATIVE for nausea, abdominal pain, heartburn, or change in bowel habits  : NEGATIVE for frequency, dysuria, or hematuria  MUSCULOSKELETAL: NEGATIVE for significant arthralgias or myalgia  NEURO: NEGATIVE for weakness, dizziness or paresthesias  ENDOCRINE: NEGATIVE for temperature intolerance, skin/hair changes  HEME: NEGATIVE for bleeding problems  PSYCHIATRIC: NEGATIVE for changes in mood or affect    Objective    /70   Pulse 72   Temp 97.8  F (36.6  C) (Oral)   Resp 16   Ht 1.68 m (5' 6.14\")   Wt 76.8 kg (169 lb 4.8 oz)   LMP 12/26/2024 (Approximate)   SpO2 98%   BMI 27.21 kg/m     Estimated body mass index is 27.21 kg/m  as calculated from the following:    Height as of this encounter: 1.68 m (5' 6.14\").    Weight as of this encounter: 76.8 kg (169 lb 4.8 oz).  Physical Exam  GENERAL: alert and no distress  EYES: Eyes grossly normal to inspection, PERRL and conjunctivae and sclerae normal  HENT: ear canals and TM's normal, nose and mouth without ulcers or lesions  NECK: no adenopathy, no asymmetry, masses, or scars  RESP: lungs clear to auscultation - no rales, rhonchi or wheezes  CV: regular rate and rhythm, normal S1 S2, no S3 or S4, no murmur, click or rub, no peripheral edema  ABDOMEN: soft, nontender, no hepatosplenomegaly, no masses and bowel sounds normal  MS: no gross musculoskeletal defects noted, no edema  PSYCH: mentation appears normal, affect normal/bright    Recent Labs   Lab Test 10/01/24  1255   HGB 13.8         POTASSIUM 4.0   CR 0.82   A1C 5.6        Diagnostics  Labs pending at this time.  Results will be reviewed when available.   No EKG required, no history of coronary heart disease, significant arrhythmia, peripheral arterial disease or other structural heart disease.    Revised Cardiac Risk Index (RCRI)  The patient has the following serious " cardiovascular risks for perioperative complications:   - No serious cardiac risks = 0 points     RCRI Interpretation: 0 points: Class I (very low risk - 0.4% complication rate)         Signed Electronically by: Vikki Parra DO  A copy of this evaluation report is provided to the requesting physician.

## 2025-01-20 ENCOUNTER — ANESTHESIA EVENT (OUTPATIENT)
Dept: SURGERY | Facility: AMBULATORY SURGERY CENTER | Age: 40
End: 2025-01-20
Payer: COMMERCIAL

## 2025-01-22 ENCOUNTER — ANESTHESIA (OUTPATIENT)
Dept: SURGERY | Facility: AMBULATORY SURGERY CENTER | Age: 40
End: 2025-01-22
Payer: COMMERCIAL

## 2025-01-22 ENCOUNTER — HOSPITAL ENCOUNTER (OUTPATIENT)
Facility: AMBULATORY SURGERY CENTER | Age: 40
Discharge: HOME | End: 2025-01-22
Attending: OBSTETRICS & GYNECOLOGY
Payer: COMMERCIAL

## 2025-01-22 VITALS
DIASTOLIC BLOOD PRESSURE: 79 MMHG | HEART RATE: 59 BPM | OXYGEN SATURATION: 100 % | RESPIRATION RATE: 12 BRPM | TEMPERATURE: 97.9 F | SYSTOLIC BLOOD PRESSURE: 131 MMHG

## 2025-01-22 DIAGNOSIS — G89.18 POST-OP PAIN: Primary | ICD-10-CM

## 2025-01-22 DIAGNOSIS — T83.32XA MALPOSITIONED INTRAUTERINE DEVICE (IUD), INITIAL ENCOUNTER: ICD-10-CM

## 2025-01-22 LAB
HCG UR QL: NEGATIVE
INTERNAL QC OK POCT: NORMAL
POCT KIT EXPIRATION DATE: 1026
POCT KIT LOT NUMBER: NORMAL

## 2025-01-22 RX ORDER — ONDANSETRON 2 MG/ML
4 INJECTION INTRAMUSCULAR; INTRAVENOUS EVERY 30 MIN PRN
Status: DISCONTINUED | OUTPATIENT
Start: 2025-01-22 | End: 2025-01-23 | Stop reason: HOSPADM

## 2025-01-22 RX ORDER — IBUPROFEN 800 MG/1
800 TABLET, FILM COATED ORAL ONCE
Status: DISCONTINUED | OUTPATIENT
Start: 2025-01-22 | End: 2025-01-23 | Stop reason: HOSPADM

## 2025-01-22 RX ORDER — FENTANYL CITRATE 50 UG/ML
INJECTION, SOLUTION INTRAMUSCULAR; INTRAVENOUS PRN
Status: DISCONTINUED | OUTPATIENT
Start: 2025-01-22 | End: 2025-01-22

## 2025-01-22 RX ORDER — FENTANYL CITRATE 0.05 MG/ML
25 INJECTION, SOLUTION INTRAMUSCULAR; INTRAVENOUS
Status: DISCONTINUED | OUTPATIENT
Start: 2025-01-22 | End: 2025-01-23 | Stop reason: HOSPADM

## 2025-01-22 RX ORDER — KETOROLAC TROMETHAMINE 30 MG/ML
INJECTION, SOLUTION INTRAMUSCULAR; INTRAVENOUS PRN
Status: DISCONTINUED | OUTPATIENT
Start: 2025-01-22 | End: 2025-01-22

## 2025-01-22 RX ORDER — NALOXONE HYDROCHLORIDE 0.4 MG/ML
0.1 INJECTION, SOLUTION INTRAMUSCULAR; INTRAVENOUS; SUBCUTANEOUS
Status: DISCONTINUED | OUTPATIENT
Start: 2025-01-22 | End: 2025-01-23 | Stop reason: HOSPADM

## 2025-01-22 RX ORDER — IBUPROFEN 800 MG/1
800 TABLET, FILM COATED ORAL EVERY 6 HOURS PRN
Qty: 30 TABLET | Refills: 0 | Status: SHIPPED | OUTPATIENT
Start: 2025-01-22

## 2025-01-22 RX ORDER — DEXAMETHASONE SODIUM PHOSPHATE 4 MG/ML
INJECTION, SOLUTION INTRA-ARTICULAR; INTRALESIONAL; INTRAMUSCULAR; INTRAVENOUS; SOFT TISSUE PRN
Status: DISCONTINUED | OUTPATIENT
Start: 2025-01-22 | End: 2025-01-22

## 2025-01-22 RX ORDER — OXYCODONE HYDROCHLORIDE 5 MG/1
5 TABLET ORAL
Status: DISCONTINUED | OUTPATIENT
Start: 2025-01-22 | End: 2025-01-23 | Stop reason: HOSPADM

## 2025-01-22 RX ORDER — ONDANSETRON 2 MG/ML
INJECTION INTRAMUSCULAR; INTRAVENOUS PRN
Status: DISCONTINUED | OUTPATIENT
Start: 2025-01-22 | End: 2025-01-22

## 2025-01-22 RX ORDER — PROPOFOL 10 MG/ML
INJECTION, EMULSION INTRAVENOUS PRN
Status: DISCONTINUED | OUTPATIENT
Start: 2025-01-22 | End: 2025-01-22

## 2025-01-22 RX ORDER — LIDOCAINE HYDROCHLORIDE 10 MG/ML
INJECTION, SOLUTION EPIDURAL; INFILTRATION; INTRACAUDAL; PERINEURAL PRN
Status: DISCONTINUED | OUTPATIENT
Start: 2025-01-22 | End: 2025-01-22 | Stop reason: HOSPADM

## 2025-01-22 RX ORDER — SODIUM CHLORIDE, SODIUM LACTATE, POTASSIUM CHLORIDE, CALCIUM CHLORIDE 600; 310; 30; 20 MG/100ML; MG/100ML; MG/100ML; MG/100ML
INJECTION, SOLUTION INTRAVENOUS CONTINUOUS
Status: DISCONTINUED | OUTPATIENT
Start: 2025-01-22 | End: 2025-01-23 | Stop reason: HOSPADM

## 2025-01-22 RX ORDER — LIDOCAINE HYDROCHLORIDE 20 MG/ML
INJECTION, SOLUTION INFILTRATION; PERINEURAL PRN
Status: DISCONTINUED | OUTPATIENT
Start: 2025-01-22 | End: 2025-01-22

## 2025-01-22 RX ORDER — ONDANSETRON 4 MG/1
4 TABLET, ORALLY DISINTEGRATING ORAL EVERY 30 MIN PRN
Status: DISCONTINUED | OUTPATIENT
Start: 2025-01-22 | End: 2025-01-23 | Stop reason: HOSPADM

## 2025-01-22 RX ORDER — ACETAMINOPHEN 325 MG/1
975 TABLET ORAL ONCE
Status: DISCONTINUED | OUTPATIENT
Start: 2025-01-22 | End: 2025-01-23 | Stop reason: HOSPADM

## 2025-01-22 RX ORDER — ACETAMINOPHEN 325 MG/1
975 TABLET ORAL ONCE
Status: COMPLETED | OUTPATIENT
Start: 2025-01-22 | End: 2025-01-22

## 2025-01-22 RX ORDER — LIDOCAINE 40 MG/G
CREAM TOPICAL
Status: DISCONTINUED | OUTPATIENT
Start: 2025-01-22 | End: 2025-01-23 | Stop reason: HOSPADM

## 2025-01-22 RX ORDER — OXYCODONE HYDROCHLORIDE 10 MG/1
10 TABLET ORAL
Status: DISCONTINUED | OUTPATIENT
Start: 2025-01-22 | End: 2025-01-23 | Stop reason: HOSPADM

## 2025-01-22 RX ORDER — PROPOFOL 10 MG/ML
INJECTION, EMULSION INTRAVENOUS CONTINUOUS PRN
Status: DISCONTINUED | OUTPATIENT
Start: 2025-01-22 | End: 2025-01-22

## 2025-01-22 RX ORDER — ACETAMINOPHEN 325 MG/1
975 TABLET ORAL EVERY 6 HOURS PRN
Qty: 50 TABLET | Refills: 0 | Status: SHIPPED | OUTPATIENT
Start: 2025-01-22

## 2025-01-22 RX ADMIN — DEXAMETHASONE SODIUM PHOSPHATE 8 MG: 4 INJECTION, SOLUTION INTRA-ARTICULAR; INTRALESIONAL; INTRAMUSCULAR; INTRAVENOUS; SOFT TISSUE at 08:26

## 2025-01-22 RX ADMIN — LIDOCAINE HYDROCHLORIDE 2 ML: 20 INJECTION, SOLUTION INFILTRATION; PERINEURAL at 08:22

## 2025-01-22 RX ADMIN — KETOROLAC TROMETHAMINE 15 MG: 30 INJECTION, SOLUTION INTRAMUSCULAR; INTRAVENOUS at 08:39

## 2025-01-22 RX ADMIN — ONDANSETRON 4 MG: 2 INJECTION INTRAMUSCULAR; INTRAVENOUS at 08:24

## 2025-01-22 RX ADMIN — SODIUM CHLORIDE, SODIUM LACTATE, POTASSIUM CHLORIDE, CALCIUM CHLORIDE: 600; 310; 30; 20 INJECTION, SOLUTION INTRAVENOUS at 07:56

## 2025-01-22 RX ADMIN — FENTANYL CITRATE 25 MCG: 50 INJECTION, SOLUTION INTRAMUSCULAR; INTRAVENOUS at 08:30

## 2025-01-22 RX ADMIN — FENTANYL CITRATE 25 MCG: 50 INJECTION, SOLUTION INTRAMUSCULAR; INTRAVENOUS at 08:24

## 2025-01-22 RX ADMIN — ACETAMINOPHEN 975 MG: 325 TABLET ORAL at 07:55

## 2025-01-22 RX ADMIN — PROPOFOL 40 MG: 10 INJECTION, EMULSION INTRAVENOUS at 08:23

## 2025-01-22 RX ADMIN — PROPOFOL 160 MCG/KG/MIN: 10 INJECTION, EMULSION INTRAVENOUS at 08:22

## 2025-01-22 NOTE — INTERVAL H&P NOTE
"I have reviewed the surgical (or preoperative) H&P that is linked to this encounter, and examined the patient. There are no significant changes    Clinical Conditions Present on Arrival:  Clinically Significant Risk Factors Present on Admission                       # Overweight: Estimated body mass index is 27.21 kg/m  as calculated from the following:    Height as of 1/13/25: 1.68 m (5' 6.14\").    Weight as of 1/13/25: 76.8 kg (169 lb 4.8 oz).       "

## 2025-01-22 NOTE — DISCHARGE INSTRUCTIONS
If you have any questions or concerns regarding your procedure, please contact Dr. Hunt, her office number is [phone].     You have received 975 mg of Acetaminophen (Tylenol) at 0755. Please do not take an additional dose of Tylenol until after 1:55pm.     Do not exceed 4,000 mg of acetaminophen during a 24 hour period and keep in mind that acetaminophen can also be found in many over-the-counter cold medications as well as narcotics that may be given for pain.      You received a medication called Toradol (a stronger IV ibuprofen) at 8:39am. Do NOT take any Ibuprofen / Advil / Aleve / Naproxen or products containing Ibuprofen until 2:36pm or later.     Do not take more than 2000mg of NSAIDS, such as Advil and Ibuprofen, in 24hrs.      Apply ice to the incision site as needed for pain. Twenty minutes with the ice on and then twenty minutes with the ice off.      Adult Discharge Orders & Instructions     For 24 hours after surgery    Get plenty of rest.  A responsible adult must stay with you for at least 24 hours after you leave the hospital.   Do not drive or use heavy equipment.  If you have weakness or tingling, don't drive or use heavy equipment until this feeling goes away.  Do not drink alcohol.  Avoid strenuous or risky activities.  Ask for help when climbing stairs.   You may feel lightheaded.  IF so, sit for a few minutes before standing.  Have someone help you get up.   If you have nausea (feel sick to your stomach): Drink only clear liquids such as apple juice, ginger ale, broth or 7-Up.  Rest may also help.  Be sure to drink enough fluids.  Move to a regular diet as you feel able.  You may have a slight fever. Call the doctor if your fever is over 100 F (37.7 C) (taken under the tongue) or lasts longer than 24 hours.  You may have a dry mouth, a sore throat, muscle aches or trouble sleeping.  These should go away after 24 hours.  Do not make important or legal decisions.     Call your doctor for  any of the followin.  Signs of infection (fever, growing tenderness at the surgery site, a large amount of drainage or bleeding, severe pain, foul-smelling drainage, redness, swelling).    2. It has been over 8 to 10 hours since surgery and you are still not able to urinate (pass water).    3.  Headache for over 24 hours.

## 2025-01-22 NOTE — ANESTHESIA CARE TRANSFER NOTE
Patient: Anabelle Herring    Procedure: Procedure(s):  HYSTEROSCOPY, DIAGNOSTIC, REMOVAL, INTRAUTERINE DEVICE       Diagnosis: Malpositioned intrauterine device (IUD), initial encounter [T83.32XA]  Diagnosis Additional Information: No value filed.    Anesthesia Type:   MAC     Note:    Oropharynx: oropharynx clear of all foreign objects  Level of Consciousness: drowsy  Oxygen Supplementation: face mask  Level of Supplemental Oxygen (L/min / FiO2): 6  Independent Airway: airway patency satisfactory and stable  Dentition: dentition unchanged  Vital Signs Stable: post-procedure vital signs reviewed and stable  Report to RN Given: handoff report given  Patient transferred to: Phase II    Handoff Report: Identifed the Patient, Identified the Reponsible Provider, Reviewed the pertinent medical history, Discussed the surgical course, Reviewed Intra-OP anesthesia mangement and issues during anesthesia, Set expectations for post-procedure period and Allowed opportunity for questions and acknowledgement of understanding      Vitals:  Vitals Value Taken Time   /60 01/22/25 0847   Temp 97.9  F (36.6  C) 01/22/25 0847   Pulse 56 01/22/25 0847   Resp 12 01/22/25 0847   SpO2 100 % 01/22/25 0847       Electronically Signed By: SUPA Triplett CRNA  January 22, 2025  8:48 AM

## 2025-01-22 NOTE — ANESTHESIA POSTPROCEDURE EVALUATION
Patient: Anabelle Herring    Procedure: Procedure(s):  HYSTEROSCOPY, DIAGNOSTIC, REMOVAL, INTRAUTERINE DEVICE       Anesthesia Type:  MAC    Note:  Disposition: Outpatient   Postop Pain Control: Uneventful            Sign Out: Well controlled pain   PONV: No   Neuro/Psych: Uneventful            Sign Out: Acceptable/Baseline neuro status   Airway/Respiratory: Uneventful            Sign Out: Acceptable/Baseline resp. status   CV/Hemodynamics: Uneventful            Sign Out: Acceptable CV status; No obvious hypovolemia; No obvious fluid overload   Other NRE: NONE   DID A NON-ROUTINE EVENT OCCUR? No           Last vitals:  Vitals Value Taken Time   /79 01/22/25 0920   Temp 97.9  F (36.6  C) 01/22/25 0847   Pulse 59 01/22/25 0920   Resp 12 01/22/25 0851   SpO2 100 % 01/22/25 0917   Vitals shown include unfiled device data.    Electronically Signed By: Raza Wiggins MD  January 22, 2025  9:57 AM

## 2025-01-22 NOTE — OP NOTE
DATE OF SERVICE: 1/22/2025    PREOPERATIVE DIAGNOSIS: malpositioned IUD    POSTOPERATIVE DIAGNOSIS: same    PROCEDURE: hysteroscopy, removal of IUD     SURGEON:  Mary Hunt     FINDINGS: arms of IUD diving into posterior uterus    ANESTHESIA: local with sedation    ESTIMATED BLOOD LOSS: 0 cc    SPECIMENS: endometrial curettings     DRAINS: none    COMPLICATIONS: none    Brief History:  Anabelle is a 38 yo  with a Mirena IUD that was noted to be in the left/posterior myometrium for at least part of it.  It had originally been placed in 2018.  She has had no abnormal bleeding prior to having one month with two periods, which prompted the ultrasound.  Discussion was had, and decision was made to proceed with removal with hysteroscopic guidance.    Procedure:  The patient was brought to the operating room where sedation medication was given.  After adequate anesthesia was obtained, she was placed in dorsal lithotomy position.  She was prepped and draped in the usual sterile fashion.  Time out was done confirming the patient and the procedure.  Bladder was emptied via straight catheter.  Speculum was inserted.  Ten cc of 1% lidocaine were infiltrated circumferentially around the cervix. Skein's tenaculum was placed on the anterior lip of the cervix.  The cervix was atraumatically dilated.  Hysteroscope was inserted with good visualization.  The IUD was easily identified with the tail in the cervical canal and the arms diving into the posterior lower uterus.  The strings were grasped with a hysteroscopic grasper, and the IUD was removed intact.  Hysteroscope was then re-inserted with confirmation of an empty uterine cavity with good hemostasis noted.  Instrumentation was removed from the cervix and vagina.  She was returned to supine position and brought to the recovery room in stable condition.  Sponge and sharp counts were correct.  She is expected to go home on the day of the procedure.

## 2025-01-22 NOTE — ANESTHESIA PREPROCEDURE EVALUATION
Anesthesia Pre-Procedure Evaluation    Patient: Anabelle Herring   MRN: [id number] : 1985        Procedure : Procedure(s):  HYSTEROSCOPY, DIAGNOSTIC, REMOVAL, INTRAUTERINE DEVICE  REMOVAL, INTRAUTERINE DEVICE          History reviewed. No pertinent past medical history.   Past Surgical History:   Procedure Laterality Date    WISDOM TOOTH EXTRACTION      as an adult      No Known Allergies   Social History     Tobacco Use    Smoking status: Never     Passive exposure: Never    Smokeless tobacco: Never   Substance Use Topics    Alcohol use: No     Comment: Alcoholic Drinks/day: before pregnancy      Wt Readings from Last 1 Encounters:   25 76.8 kg (169 lb 4.8 oz)        Anesthesia Evaluation   Pt has had prior anesthetic.     No history of anesthetic complications       ROS/MED HX  ENT/Pulmonary:  - neg pulmonary ROS     Neurologic:  - neg neurologic ROS     Cardiovascular:  - neg cardiovascular ROS     METS/Exercise Tolerance:     Hematologic:  - neg hematologic  ROS     Musculoskeletal:  - neg musculoskeletal ROS     GI/Hepatic:  - neg GI/hepatic ROS     Renal/Genitourinary:  - neg Renal ROS     Endo:  - neg endo ROS     Psychiatric/Substance Use:  - neg psychiatric ROS     Infectious Disease:  - neg infectious disease ROS     Malignancy:  - neg malignancy ROS     Other:  - neg other ROS          Physical Exam    Airway        Mallampati: II   TM distance: > 3 FB   Neck ROM: full   Mouth opening: > 3 cm    Respiratory Devices and Support         Dental       (+) Minor Abnormalities - some fillings, tiny chips      Cardiovascular   cardiovascular exam normal          Pulmonary   pulmonary exam normal                OUTSIDE LABS:  CBC:   Lab Results   Component Value Date    WBC 4.1 2025    WBC 4.1 10/01/2024    HGB 13.5 2025    HGB 13.8 10/01/2024    HCT 40.1 2025    HCT 39.5 10/01/2024     2025     10/01/2024     BMP:   Lab Results   Component Value  "Date     01/13/2025     10/01/2024    POTASSIUM 4.1 01/13/2025    POTASSIUM 4.0 10/01/2024    CHLORIDE 105 01/13/2025    CHLORIDE 103 10/01/2024    CO2 25 01/13/2025    CO2 24 10/01/2024    BUN 13.1 01/13/2025    BUN 13.3 10/01/2024    CR 0.86 01/13/2025    CR 0.82 10/01/2024    GLC 85 01/13/2025    GLC 85 10/01/2024     COAGS: No results found for: \"PTT\", \"INR\", \"FIBR\"  POC:   Lab Results   Component Value Date    HCG Negative 01/22/2025     HEPATIC:   Lab Results   Component Value Date    ALBUMIN 4.5 10/01/2024    PROTTOTAL 7.7 10/01/2024    ALT 15 10/01/2024    AST 31 10/01/2024    ALKPHOS 43 10/01/2024    BILITOTAL 0.9 10/01/2024     OTHER:   Lab Results   Component Value Date    A1C 5.6 10/01/2024    CHIKIS 9.1 01/13/2025    TSH 1.01 10/01/2024       Anesthesia Plan    ASA Status:  1    NPO Status:  NPO Appropriate    Anesthesia Type: MAC.     - Reason for MAC: immobility needed, straight local not clinically adequate   Induction: Propofol.   Maintenance: TIVA.        Consents    Anesthesia Plan(s) and associated risks, benefits, and realistic alternatives discussed. Questions answered and patient/representative(s) expressed understanding.     - Discussed:     - Discussed with:  Patient            Postoperative Care    Pain management: Multi-modal analgesia.   PONV prophylaxis: Ondansetron (or other 5HT-3), Dexamethasone or Solumedrol     Comments:    Other Comments: Toradol if OK with surgeon    Reviewed anesthetic options and risks. Patient agrees to proceed.            Raza Wiggins MD    I have reviewed the pertinent notes and labs in the chart from the past 30 days and (re)examined the patient.  Any updates or changes from those notes are reflected in this note.    Clinically Significant Risk Factors Present on Admission                             # Overweight: Estimated body mass index is 27.21 kg/m  as calculated from the following:    Height as of 1/13/25: 1.68 m (5' 6.14\").    Weight as of " 1/13/25: 76.8 kg (169 lb 4.8 oz).

## 2025-02-25 ENCOUNTER — OFFICE VISIT (OUTPATIENT)
Dept: OBGYN | Facility: CLINIC | Age: 40
End: 2025-02-25
Payer: COMMERCIAL

## 2025-02-25 VITALS
HEIGHT: 66 IN | WEIGHT: 167 LBS | DIASTOLIC BLOOD PRESSURE: 74 MMHG | BODY MASS INDEX: 26.84 KG/M2 | OXYGEN SATURATION: 97 % | HEART RATE: 73 BPM | SYSTOLIC BLOOD PRESSURE: 126 MMHG

## 2025-02-25 DIAGNOSIS — Z01.812 PRE-PROCEDURE LAB EXAM: Primary | ICD-10-CM

## 2025-02-25 DIAGNOSIS — Z30.430 ENCOUNTER FOR INSERTION OF INTRAUTERINE CONTRACEPTIVE DEVICE: ICD-10-CM

## 2025-02-25 LAB — HCG UR QL: NEGATIVE

## 2025-02-25 NOTE — PROGRESS NOTES
"IUD Insertion Procedure Note    Subjective: Anabelle is here for an IUD insertion.  She had her previous Mirena removed on 1/22/25 in the operating room because the arms were imbedded in the posterior uterus.  Initially she was uncertain about IUD vs Nexplanon, but after discussing pros and cons of both, she decided on the IUD.    Objective:   /74 (BP Location: Right arm, Patient Position: Sitting, Cuff Size: Adult Regular)   Pulse 73   Ht 1.68 m (5' 6.14\")   Wt 75.8 kg (167 lb)   LMP 12/26/2024 (Exact Date)   Body mass index is 26.84 kg/m .    Urine pregnancy test was done and result was negative.    Procedure Details   The risks (including infection, bleeding, pain, and uterine perforation, expulsion and failure to prevent pregancy, uterine perforation) and benefits of the procedure were explained to the patient and informed consent was obtained.      A speculum was placed, and the cervix and upper vagina were prepped with Betadine. The anterior lip of the cervix was grasped with a single-tooth tenaculum.  Uterus was sounded to a depth of 6 cm, and the IUD was placed without difficulty in the uterine fundus.  Strings were trimmed to 3 cm in length.  Speculum and tenaculum were removed. The patient tolerated the procedure well.  Lot # [id number], exp date Feb 2027.    Assessment  Successful IUD insertion.    Plan  She is given instructions for checking her IUD string and is encouraged to call for any concerns.          "

## 2025-04-27 ENCOUNTER — HEALTH MAINTENANCE LETTER (OUTPATIENT)
Age: 40
End: 2025-04-27